# Patient Record
Sex: FEMALE | Race: WHITE | NOT HISPANIC OR LATINO | Employment: OTHER | URBAN - METROPOLITAN AREA
[De-identification: names, ages, dates, MRNs, and addresses within clinical notes are randomized per-mention and may not be internally consistent; named-entity substitution may affect disease eponyms.]

---

## 2018-10-08 ENCOUNTER — OFFICE VISIT CONVERTED (OUTPATIENT)
Dept: PLASTIC SURGERY | Facility: CLINIC | Age: 23
End: 2018-10-08
Attending: PLASTIC SURGERY

## 2018-10-25 ENCOUNTER — PROCEDURE VISIT CONVERTED (OUTPATIENT)
Dept: PLASTIC SURGERY | Facility: CLINIC | Age: 23
End: 2018-10-25
Attending: PLASTIC SURGERY

## 2018-11-02 ENCOUNTER — OFFICE VISIT CONVERTED (OUTPATIENT)
Dept: PLASTIC SURGERY | Facility: CLINIC | Age: 23
End: 2018-11-02
Attending: PLASTIC SURGERY

## 2021-05-16 VITALS
BODY MASS INDEX: 21.97 KG/M2 | WEIGHT: 124 LBS | HEART RATE: 91 BPM | HEIGHT: 63 IN | DIASTOLIC BLOOD PRESSURE: 87 MMHG | OXYGEN SATURATION: 96 % | SYSTOLIC BLOOD PRESSURE: 120 MMHG

## 2021-05-16 VITALS
SYSTOLIC BLOOD PRESSURE: 128 MMHG | HEIGHT: 63 IN | BODY MASS INDEX: 22.06 KG/M2 | OXYGEN SATURATION: 98 % | HEART RATE: 87 BPM | WEIGHT: 124.5 LBS | DIASTOLIC BLOOD PRESSURE: 90 MMHG

## 2021-05-16 VITALS
HEIGHT: 63 IN | BODY MASS INDEX: 21.97 KG/M2 | WEIGHT: 124 LBS | OXYGEN SATURATION: 99 % | DIASTOLIC BLOOD PRESSURE: 97 MMHG | SYSTOLIC BLOOD PRESSURE: 132 MMHG | HEART RATE: 90 BPM

## 2022-05-25 ENCOUNTER — LAB (OUTPATIENT)
Dept: LAB | Facility: HOSPITAL | Age: 27
End: 2022-05-25

## 2022-05-25 ENCOUNTER — OFFICE VISIT (OUTPATIENT)
Dept: FAMILY MEDICINE CLINIC | Facility: CLINIC | Age: 27
End: 2022-05-25

## 2022-05-25 VITALS
BODY MASS INDEX: 21.79 KG/M2 | DIASTOLIC BLOOD PRESSURE: 92 MMHG | HEIGHT: 63 IN | HEART RATE: 111 BPM | SYSTOLIC BLOOD PRESSURE: 124 MMHG | OXYGEN SATURATION: 100 % | WEIGHT: 123 LBS

## 2022-05-25 DIAGNOSIS — Z76.89 ENCOUNTER TO ESTABLISH CARE: ICD-10-CM

## 2022-05-25 DIAGNOSIS — F41.9 ANXIETY: Primary | ICD-10-CM

## 2022-05-25 DIAGNOSIS — Z13.220 SCREENING FOR LIPID DISORDERS: ICD-10-CM

## 2022-05-25 DIAGNOSIS — Z11.59 NEED FOR HEPATITIS C SCREENING TEST: ICD-10-CM

## 2022-05-25 DIAGNOSIS — Z13.29 SCREENING FOR THYROID DISORDER: ICD-10-CM

## 2022-05-25 DIAGNOSIS — F32.A DEPRESSION, UNSPECIFIED DEPRESSION TYPE: ICD-10-CM

## 2022-05-25 DIAGNOSIS — F90.9 ATTENTION DEFICIT HYPERACTIVITY DISORDER (ADHD), UNSPECIFIED ADHD TYPE: ICD-10-CM

## 2022-05-25 LAB
BASOPHILS # BLD AUTO: 0.04 10*3/MM3 (ref 0–0.2)
BASOPHILS NFR BLD AUTO: 0.5 % (ref 0–1.5)
DEPRECATED RDW RBC AUTO: 41.6 FL (ref 37–54)
EOSINOPHIL # BLD AUTO: 0.03 10*3/MM3 (ref 0–0.4)
EOSINOPHIL NFR BLD AUTO: 0.4 % (ref 0.3–6.2)
ERYTHROCYTE [DISTWIDTH] IN BLOOD BY AUTOMATED COUNT: 12.1 % (ref 12.3–15.4)
HCT VFR BLD AUTO: 41.9 % (ref 34–46.6)
HGB BLD-MCNC: 14 G/DL (ref 12–15.9)
IMM GRANULOCYTES # BLD AUTO: 0.02 10*3/MM3 (ref 0–0.05)
IMM GRANULOCYTES NFR BLD AUTO: 0.3 % (ref 0–0.5)
LYMPHOCYTES # BLD AUTO: 2.31 10*3/MM3 (ref 0.7–3.1)
LYMPHOCYTES NFR BLD AUTO: 30.8 % (ref 19.6–45.3)
MCH RBC QN AUTO: 31.1 PG (ref 26.6–33)
MCHC RBC AUTO-ENTMCNC: 33.4 G/DL (ref 31.5–35.7)
MCV RBC AUTO: 93.1 FL (ref 79–97)
MONOCYTES # BLD AUTO: 0.62 10*3/MM3 (ref 0.1–0.9)
MONOCYTES NFR BLD AUTO: 8.3 % (ref 5–12)
NEUTROPHILS NFR BLD AUTO: 4.47 10*3/MM3 (ref 1.7–7)
NEUTROPHILS NFR BLD AUTO: 59.7 % (ref 42.7–76)
NRBC BLD AUTO-RTO: 0 /100 WBC (ref 0–0.2)
PLATELET # BLD AUTO: 340 10*3/MM3 (ref 140–450)
PMV BLD AUTO: 10.8 FL (ref 6–12)
RBC # BLD AUTO: 4.5 10*6/MM3 (ref 3.77–5.28)
WBC NRBC COR # BLD: 7.49 10*3/MM3 (ref 3.4–10.8)

## 2022-05-25 PROCEDURE — 80061 LIPID PANEL: CPT

## 2022-05-25 PROCEDURE — 84443 ASSAY THYROID STIM HORMONE: CPT

## 2022-05-25 PROCEDURE — 36415 COLL VENOUS BLD VENIPUNCTURE: CPT

## 2022-05-25 PROCEDURE — 84439 ASSAY OF FREE THYROXINE: CPT

## 2022-05-25 PROCEDURE — 99203 OFFICE O/P NEW LOW 30 MIN: CPT | Performed by: NURSE PRACTITIONER

## 2022-05-25 PROCEDURE — 85025 COMPLETE CBC W/AUTO DIFF WBC: CPT

## 2022-05-25 PROCEDURE — 80074 ACUTE HEPATITIS PANEL: CPT

## 2022-05-25 PROCEDURE — 80053 COMPREHEN METABOLIC PANEL: CPT

## 2022-05-25 RX ORDER — CHOLECALCIFEROL (VITAMIN D3) 125 MCG
5 CAPSULE ORAL
COMMUNITY

## 2022-05-25 RX ORDER — LISDEXAMFETAMINE DIMESYLATE 30 MG/1
CAPSULE ORAL
COMMUNITY
Start: 2022-05-12

## 2022-05-25 RX ORDER — RIZATRIPTAN BENZOATE 10 MG/1
10 TABLET, ORALLY DISINTEGRATING ORAL
COMMUNITY
End: 2022-05-25

## 2022-05-25 RX ORDER — SUMATRIPTAN 100 MG/1
100 TABLET, FILM COATED ORAL
COMMUNITY
End: 2022-05-25

## 2022-05-25 RX ORDER — BUSPIRONE HYDROCHLORIDE 7.5 MG/1
7.5 TABLET ORAL 3 TIMES DAILY
COMMUNITY
Start: 2022-05-05

## 2022-05-25 RX ORDER — LEVONORGESTREL AND ETHINYL ESTRADIOL 0.15-0.03
KIT ORAL
COMMUNITY
End: 2022-05-25

## 2022-05-25 NOTE — PROGRESS NOTES
Chief Complaint  Establish Care and Annual Exam/labwork    Subjective            Nicole Meade presents to [facility]  Pt here today to establish care. Previous PCP was at [facility]. Pt states its been a few years since her last visit.     Pt sees Tiffanie Demar at [facility] for ADHD and Anxiety. They manage her Buspar, Sertaline, and Vyvanse.    Labs and pap due.    No problems or concerns at this time.         Past Medical History:   Diagnosis Date   • ADHD (attention deficit hyperactivity disorder) 2014   • Anxiety 2014   • Depression 2014   • Headache 2018       No Known Allergies     History reviewed. No pertinent surgical history.     Social History     Tobacco Use   • Smoking status: Never Smoker   • Smokeless tobacco: Not on file   Substance Use Topics   • Alcohol use: Never       Family History   Problem Relation Age of Onset   • Anxiety disorder Mother    • Depression Mother    • Heart disease Mother    • Hyperlipidemia Mother    • Kidney disease Mother    • Anxiety disorder Father    • Depression Father    • Heart disease Father    • Hyperlipidemia Father    • Kidney disease Father    • Anxiety disorder Maternal Grandfather    • Alcohol abuse Maternal Grandmother    • Anxiety disorder Brother    • Depression Brother         Current Outpatient Medications on File Prior to Visit   Medication Sig   • busPIRone (BUSPAR) 7.5 MG tablet Take 7.5 mg by mouth 3 (Three) Times a Day.   • melatonin 5 MG tablet tablet Take 5 mg by mouth.   • sertraline (ZOLOFT) 50 MG tablet TAKE 1 AND 1/2 TABLETS BY MOUTH EVERY MORNING FOR ANXIETY OR DEPRESSION   • Vyvanse 30 MG capsule TAKE 1 CAPSULE BY MOUTH EVERY MORNING FOR ADHD   • [DISCONTINUED] levonorgestrel-ethinyl estradiol (NORDETTE) 0.15-30 MG-MCG per tablet Altavera (28) 0.15-0.03 mg oral tablet take 1 tablet by oral route once daily   Active   • [DISCONTINUED] propranolol XL (INNOPRAN XL) 80 MG 24 hr capsule Take 80 mg by mouth Daily.  "  • [DISCONTINUED] rizatriptan MLT (MAXALT-MLT) 10 MG disintegrating tablet Take 10 mg by mouth.   • [DISCONTINUED] SUMAtriptan (IMITREX) 100 MG tablet Take 100 mg by mouth.     No current facility-administered medications on file prior to visit.       Health Maintenance Due   Topic Date Due   • ANNUAL PHYSICAL  Never done   • TDAP/TD VACCINES (2 - Td or Tdap) 08/03/2017   • COVID-19 Vaccine (3 - Booster for Pfizer series) 11/05/2021   • HEPATITIS C SCREENING  Never done   • PAP SMEAR  Never done       Objective     /92   Pulse 111   Ht 160 cm (63\")   Wt 55.8 kg (123 lb)   SpO2 100%   BMI 21.79 kg/m²       Physical Exam  Constitutional:       General: She is not in acute distress.     Appearance: Normal appearance. She is not ill-appearing.   HENT:      Head: Normocephalic and atraumatic.      Mouth/Throat:      Pharynx: No oropharyngeal exudate or posterior oropharyngeal erythema.   Cardiovascular:      Rate and Rhythm: Normal rate and regular rhythm.      Heart sounds: Normal heart sounds. No murmur heard.  Pulmonary:      Effort: Pulmonary effort is normal. No respiratory distress.      Breath sounds: Normal breath sounds.   Chest:      Chest wall: No tenderness.   Abdominal:      General: There is no distension.      Palpations: There is no mass.      Tenderness: There is no abdominal tenderness. There is no guarding.   Musculoskeletal:         General: No swelling or tenderness. Normal range of motion.      Cervical back: Normal range of motion and neck supple.   Skin:     General: Skin is warm and dry.      Findings: No rash.   Neurological:      General: No focal deficit present.      Mental Status: She is alert and oriented to person, place, and time. Mental status is at baseline.      Gait: Gait normal.   Psychiatric:         Mood and Affect: Mood normal.         Behavior: Behavior normal.         Thought Content: Thought content normal.         Judgment: Judgment normal.           Result Review " :                           Assessment and Plan        Diagnoses and all orders for this visit:    1. Anxiety (Primary)  Comments:  managed by [facility], pt to continue to f/u montly for management    2. Encounter to establish care  -     CBC w AUTO Differential; Future  -     Comprehensive metabolic panel; Future  -     Lipid panel; Future  -     TSH; Future  -     T4, free; Future  -     Hepatitis panel, acute; Future    3. Screening for thyroid disorder  -     TSH; Future  -     T4, free; Future    4. Screening for lipid disorders  -     CBC w AUTO Differential; Future  -     Comprehensive metabolic panel; Future  -     Lipid panel; Future    5. Attention deficit hyperactivity disorder (ADHD), unspecified ADHD type  Comments:  managed by [facility], pt to continue to f/u montly for management    6. Depression, unspecified depression type  Comments:  managed by [facility], pt to continue to f/u montly for management    7. Need for hepatitis C screening test  -     Hepatitis panel, acute; Future              Follow Up     Return in about 1 year (around 5/25/2023) for Annual physical.    Patient was given instructions and counseling regarding her condition or for health maintenance advice. Please see specific information pulled into the AVS if appropriate.     Nicole Meade  reports that she has never smoked. She does not have any smokeless tobacco history on file..

## 2022-05-26 ENCOUNTER — TELEPHONE (OUTPATIENT)
Dept: FAMILY MEDICINE CLINIC | Facility: CLINIC | Age: 27
End: 2022-05-26

## 2022-05-26 LAB
ALBUMIN SERPL-MCNC: 4.8 G/DL (ref 3.5–5.2)
ALBUMIN/GLOB SERPL: 2 G/DL
ALP SERPL-CCNC: 84 U/L (ref 39–117)
ALT SERPL W P-5'-P-CCNC: 15 U/L (ref 1–33)
ANION GAP SERPL CALCULATED.3IONS-SCNC: 14.4 MMOL/L (ref 5–15)
AST SERPL-CCNC: 19 U/L (ref 1–32)
BILIRUB SERPL-MCNC: 0.5 MG/DL (ref 0–1.2)
BUN SERPL-MCNC: 12 MG/DL (ref 6–20)
BUN/CREAT SERPL: 15 (ref 7–25)
CALCIUM SPEC-SCNC: 9.2 MG/DL (ref 8.6–10.5)
CHLORIDE SERPL-SCNC: 103 MMOL/L (ref 98–107)
CHOLEST SERPL-MCNC: 133 MG/DL (ref 0–200)
CO2 SERPL-SCNC: 21.6 MMOL/L (ref 22–29)
CREAT SERPL-MCNC: 0.8 MG/DL (ref 0.57–1)
EGFRCR SERPLBLD CKD-EPI 2021: 103.7 ML/MIN/1.73
GLOBULIN UR ELPH-MCNC: 2.4 GM/DL
GLUCOSE SERPL-MCNC: 84 MG/DL (ref 65–99)
HAV IGM SERPL QL IA: NORMAL
HBV CORE IGM SERPL QL IA: NORMAL
HBV SURFACE AG SERPL QL IA: NORMAL
HCV AB SER DONR QL: NORMAL
HDLC SERPL-MCNC: 58 MG/DL (ref 40–60)
LDLC SERPL CALC-MCNC: 62 MG/DL (ref 0–100)
LDLC/HDLC SERPL: 1.08 {RATIO}
POTASSIUM SERPL-SCNC: 3.9 MMOL/L (ref 3.5–5.2)
PROT SERPL-MCNC: 7.2 G/DL (ref 6–8.5)
SODIUM SERPL-SCNC: 139 MMOL/L (ref 136–145)
T4 FREE SERPL-MCNC: 1.29 NG/DL (ref 0.93–1.7)
TRIGL SERPL-MCNC: 62 MG/DL (ref 0–150)
TSH SERPL DL<=0.05 MIU/L-ACNC: 1.14 UIU/ML (ref 0.27–4.2)
VLDLC SERPL-MCNC: 13 MG/DL (ref 5–40)

## 2022-05-27 ENCOUNTER — PATIENT ROUNDING (BHMG ONLY) (OUTPATIENT)
Dept: FAMILY MEDICINE CLINIC | Facility: CLINIC | Age: 27
End: 2022-05-27

## 2022-05-27 ENCOUNTER — PATIENT MESSAGE (OUTPATIENT)
Dept: FAMILY MEDICINE CLINIC | Facility: CLINIC | Age: 27
End: 2022-05-27

## 2022-05-27 NOTE — PROGRESS NOTES
A [employer] message has been sent to the patient for PATIENT ROUNDING with [facility].

## 2022-06-08 NOTE — TELEPHONE ENCOUNTER
From: Emily GRANT  To: Nicole Dasha Meade  Sent: 5/27/2022 3:23 PM EDT  Subject: Welcome to [facility]!      Raul Ms. Meade,     My name is Emily Hoang and I am the practice manager for [facility]'s office.     I wanted to officially welcome you to our practice and ask about your recent visit, as we are always looking for ways to improve. If you could take a few minutes to answer a few questions, I would appreciate your feedback.     Tell me about your visit with us.    What things went well?   Do you have any recommendations on how we can improve?   Overall were you satisfied with your first visit to our practice?     Thank you for taking the time to respond, and if there is anything you need, please do not hesitate to call us at [phone].    Sincerely,   Emily Hoang LPN  Practice Manager II

## 2022-06-15 ENCOUNTER — OFFICE VISIT (OUTPATIENT)
Dept: FAMILY MEDICINE CLINIC | Facility: CLINIC | Age: 27
End: 2022-06-15

## 2022-06-15 VITALS
HEART RATE: 138 BPM | BODY MASS INDEX: 21.72 KG/M2 | DIASTOLIC BLOOD PRESSURE: 92 MMHG | HEIGHT: 63 IN | OXYGEN SATURATION: 98 % | WEIGHT: 122.6 LBS | SYSTOLIC BLOOD PRESSURE: 135 MMHG

## 2022-06-15 DIAGNOSIS — Z01.419 WELL WOMAN EXAM: ICD-10-CM

## 2022-06-15 DIAGNOSIS — Z01.419 ENCOUNTER FOR GYNECOLOGICAL EXAMINATION: Primary | ICD-10-CM

## 2022-06-15 DIAGNOSIS — Z11.3 SCREEN FOR STD (SEXUALLY TRANSMITTED DISEASE): ICD-10-CM

## 2022-06-15 DIAGNOSIS — Z12.4 SCREENING FOR CERVICAL CANCER: ICD-10-CM

## 2022-06-15 LAB
BILIRUB BLD-MCNC: NEGATIVE MG/DL
CLARITY, POC: CLEAR
COLOR UR: YELLOW
EXPIRATION DATE: NORMAL
GLUCOSE UR STRIP-MCNC: NEGATIVE MG/DL
KETONES UR QL: NEGATIVE
LEUKOCYTE EST, POC: NEGATIVE
Lab: NORMAL
NITRITE UR-MCNC: NEGATIVE MG/ML
PH UR: 7 [PH] (ref 5–8)
PROT UR STRIP-MCNC: NEGATIVE MG/DL
RBC # UR STRIP: NEGATIVE /UL
SP GR UR: 1.01 (ref 1–1.03)
UROBILINOGEN UR QL: NORMAL

## 2022-06-15 PROCEDURE — 87510 GARDNER VAG DNA DIR PROBE: CPT | Performed by: NURSE PRACTITIONER

## 2022-06-15 PROCEDURE — 3008F BODY MASS INDEX DOCD: CPT | Performed by: NURSE PRACTITIONER

## 2022-06-15 PROCEDURE — 87205 SMEAR GRAM STAIN: CPT | Performed by: NURSE PRACTITIONER

## 2022-06-15 PROCEDURE — 87529 HSV DNA AMP PROBE: CPT | Performed by: NURSE PRACTITIONER

## 2022-06-15 PROCEDURE — 87660 TRICHOMONAS VAGIN DIR PROBE: CPT | Performed by: NURSE PRACTITIONER

## 2022-06-15 PROCEDURE — 87591 N.GONORRHOEAE DNA AMP PROB: CPT | Performed by: NURSE PRACTITIONER

## 2022-06-15 PROCEDURE — 87491 CHLMYD TRACH DNA AMP PROBE: CPT | Performed by: NURSE PRACTITIONER

## 2022-06-15 PROCEDURE — G0123 SCREEN CERV/VAG THIN LAYER: HCPCS | Performed by: NURSE PRACTITIONER

## 2022-06-15 PROCEDURE — 87070 CULTURE OTHR SPECIMN AEROBIC: CPT | Performed by: NURSE PRACTITIONER

## 2022-06-15 PROCEDURE — 2014F MENTAL STATUS ASSESS: CPT | Performed by: NURSE PRACTITIONER

## 2022-06-15 PROCEDURE — 87480 CANDIDA DNA DIR PROBE: CPT | Performed by: NURSE PRACTITIONER

## 2022-06-15 PROCEDURE — 81003 URINALYSIS AUTO W/O SCOPE: CPT | Performed by: NURSE PRACTITIONER

## 2022-06-15 PROCEDURE — 99395 PREV VISIT EST AGE 18-39: CPT | Performed by: NURSE PRACTITIONER

## 2022-06-15 NOTE — PROGRESS NOTES
"Chief Complaint  Pap smear  Subjective            Nicole Meade presents to [facility]  Gynecologic Exam (Patient is here for pap smear. Patient's last pap smear was in 2020. Patient states no history of abnormal pap smear. LMP was 6/2/22.)        Past Medical History:   Diagnosis Date   • ADHD (attention deficit hyperactivity disorder) 2014   • Anxiety 2014   • Depression 2014   • Headache 2018       No Known Allergies     History reviewed. No pertinent surgical history.     Social History     Tobacco Use   • Smoking status: Never Smoker   • Smokeless tobacco: Never Used   Substance Use Topics   • Alcohol use: Never       Family History   Problem Relation Age of Onset   • Anxiety disorder Mother    • Depression Mother    • Heart disease Mother    • Hyperlipidemia Mother    • Kidney disease Mother    • Anxiety disorder Father    • Depression Father    • Heart disease Father    • Hyperlipidemia Father    • Kidney disease Father    • Anxiety disorder Maternal Grandfather    • Alcohol abuse Maternal Grandmother    • Anxiety disorder Brother    • Depression Brother         Current Outpatient Medications on File Prior to Visit   Medication Sig   • busPIRone (BUSPAR) 7.5 MG tablet Take 7.5 mg by mouth 3 (Three) Times a Day.   • melatonin 5 MG tablet tablet Take 5 mg by mouth.   • sertraline (ZOLOFT) 50 MG tablet TAKE 1 AND 1/2 TABLETS BY MOUTH EVERY MORNING FOR ANXIETY OR DEPRESSION   • Vyvanse 30 MG capsule TAKE 1 CAPSULE BY MOUTH EVERY MORNING FOR ADHD     No current facility-administered medications on file prior to visit.       Health Maintenance Due   Topic Date Due   • ANNUAL PHYSICAL  Never done   • TDAP/TD VACCINES (2 - Td or Tdap) 08/03/2017   • COVID-19 Vaccine (3 - Booster for Pfizer series) 11/05/2021       Objective     /92 (BP Location: Left arm, Patient Position: Sitting, Cuff Size: Adult)   Pulse (!) 138   Ht 160 cm (63\")   Wt 55.6 kg (122 lb 9.6 oz)   SpO2 " 98%   BMI 21.72 kg/m²       Physical Exam  Constitutional:       General: She is awake. She is not in acute distress.     Appearance: Normal appearance. She is normal weight. She is not ill-appearing.   HENT:      Head: Normocephalic.   Cardiovascular:      Rate and Rhythm: Normal rate and regular rhythm.      Heart sounds: Normal heart sounds, S1 normal and S2 normal.   Pulmonary:      Effort: Pulmonary effort is normal.      Breath sounds: Normal breath sounds.   Chest:   Breasts:      Right: Normal. No swelling, mass, nipple discharge, skin change or tenderness.      Left: Normal. No swelling, mass, nipple discharge, skin change or tenderness.       Abdominal:      General: Abdomen is flat. Bowel sounds are normal.      Palpations: Abdomen is soft.      Tenderness: There is no abdominal tenderness.   Genitourinary:     General: Normal vulva.      Pubic Area: No rash.       Labia:         Right: No rash, tenderness or lesion.         Left: No rash, tenderness or lesion.       Urethra: No urethral pain, urethral swelling or urethral lesion.      Vagina: Normal. No vaginal discharge.      Cervix: Normal.      Uterus: Normal.       Adnexa: Right adnexa normal and left adnexa normal.      Rectum: Normal.      Comments: Pap obtained via cytobrush without complication  Musculoskeletal:         General: Normal range of motion.      Cervical back: Normal range of motion and neck supple.      Right lower leg: No edema.      Left lower leg: No edema.   Skin:     General: Skin is warm.      Findings: No lesion or rash.   Neurological:      General: No focal deficit present.      Mental Status: She is alert and oriented to person, place, and time. Mental status is at baseline.      Cranial Nerves: Cranial nerves are intact.      Motor: Motor function is intact.      Coordination: Coordination is intact.      Gait: Gait is intact.   Psychiatric:         Attention and Perception: Attention and perception normal.         Mood  and Affect: Mood and affect normal.         Speech: Speech normal.         Behavior: Behavior normal. Behavior is cooperative.         Thought Content: Thought content normal.         Cognition and Memory: Cognition and memory normal.         Judgment: Judgment normal.           Result Review :                           Assessment and Plan        Diagnoses and all orders for this visit:    1. Encounter for gynecological examination (Primary)  -     POC Urinalysis Dipstick, Automated    2. Well woman exam  -     IGP,rfx Aptima HPV All Pth; Future    3. Screening for cervical cancer  -     IGP,rfx Aptima HPV All Pth; Future      Preventative counseling:  Advised monthly self breast exams  Healthy diet  Daily exercise        Follow Up     Return in about 1 year (around 6/15/2023).    Patient was given instructions and counseling regarding her condition or for health maintenance advice. Please see specific information pulled into the AVS if appropriate.            CECY Levin

## 2022-06-16 LAB
C TRACH RRNA CVX QL NAA+PROBE: NOT DETECTED
CANDIDA SPECIES: NEGATIVE
GARDNERELLA VAGINALIS: NEGATIVE
HSV1 DNA SPEC QL NAA+PROBE: NOT DETECTED
HSV2 DNA SPEC QL NAA+PROBE: NOT DETECTED
N GONORRHOEA RRNA SPEC QL NAA+PROBE: NOT DETECTED
T VAGINALIS DNA VAG QL PROBE+SIG AMP: NEGATIVE

## 2022-06-18 LAB
BACTERIA SPEC AEROBE CULT: NORMAL
GRAM STN SPEC: NORMAL

## 2022-06-21 ENCOUNTER — TELEPHONE (OUTPATIENT)
Dept: FAMILY MEDICINE CLINIC | Facility: CLINIC | Age: 27
End: 2022-06-21

## 2022-06-21 LAB
CONV .: NORMAL
CYTOLOGIST CVX/VAG CYTO: NORMAL
CYTOLOGY CVX/VAG DOC CYTO: NORMAL
CYTOLOGY CVX/VAG DOC THIN PREP: NORMAL
DX ICD CODE: NORMAL
HIV 1 & 2 AB SER-IMP: NORMAL
Lab: NORMAL
OTHER STN SPEC: NORMAL
STAT OF ADQ CVX/VAG CYTO-IMP: NORMAL

## 2023-03-07 ENCOUNTER — HOSPITAL ENCOUNTER (OUTPATIENT)
Dept: GENERAL RADIOLOGY | Facility: HOSPITAL | Age: 28
Discharge: HOME | End: 2023-03-07
Admitting: NURSE PRACTITIONER
Payer: COMMERCIAL

## 2023-03-07 ENCOUNTER — OFFICE VISIT (OUTPATIENT)
Dept: FAMILY MEDICINE CLINIC | Facility: CLINIC | Age: 28
End: 2023-03-07
Payer: COMMERCIAL

## 2023-03-07 VITALS
HEART RATE: 105 BPM | BODY MASS INDEX: 21.97 KG/M2 | WEIGHT: 124 LBS | OXYGEN SATURATION: 99 % | SYSTOLIC BLOOD PRESSURE: 125 MMHG | HEIGHT: 63 IN | DIASTOLIC BLOOD PRESSURE: 74 MMHG

## 2023-03-07 DIAGNOSIS — M25.531 CHRONIC PAIN OF RIGHT WRIST: ICD-10-CM

## 2023-03-07 DIAGNOSIS — Z00.00 ANNUAL PHYSICAL EXAM: Primary | ICD-10-CM

## 2023-03-07 DIAGNOSIS — Z13.29 SCREENING FOR THYROID DISORDER: ICD-10-CM

## 2023-03-07 DIAGNOSIS — G89.29 CHRONIC PAIN OF RIGHT WRIST: ICD-10-CM

## 2023-03-07 DIAGNOSIS — Z13.220 SCREENING FOR LIPID DISORDERS: ICD-10-CM

## 2023-03-07 PROCEDURE — 3008F BODY MASS INDEX DOCD: CPT | Performed by: NURSE PRACTITIONER

## 2023-03-07 PROCEDURE — 99395 PREV VISIT EST AGE 18-39: CPT | Performed by: NURSE PRACTITIONER

## 2023-03-07 PROCEDURE — 2014F MENTAL STATUS ASSESS: CPT | Performed by: NURSE PRACTITIONER

## 2023-03-07 PROCEDURE — 73110 X-RAY EXAM OF WRIST: CPT

## 2023-03-07 PROCEDURE — 90715 TDAP VACCINE 7 YRS/> IM: CPT | Performed by: NURSE PRACTITIONER

## 2023-03-07 PROCEDURE — 90471 IMMUNIZATION ADMIN: CPT | Performed by: NURSE PRACTITIONER

## 2023-03-07 PROCEDURE — 1160F RVW MEDS BY RX/DR IN RCRD: CPT | Performed by: NURSE PRACTITIONER

## 2023-03-07 PROCEDURE — 1159F MED LIST DOCD IN RCRD: CPT | Performed by: NURSE PRACTITIONER

## 2023-03-07 NOTE — PROGRESS NOTES
Chief Complaint  Anxiety, Depression, and Wrist Pain    Subjective            Nicole Meade presents to [facility]  History of Present Illness  Pt is an annual CPE for anxiety and depression. Pt has c/o ongoing (R) wrist pain. Pt states she had an injury to her (R) wrist approximately 2 yrs ago. Pt was seen at [facility]. Pt states the pain is now off and on. Pt states if she moves her hand a certain way, she has pain that shoots up into her arm. Pt states she also has problems lifting items at times.     Pt is due labs.    Pt will get Tdap today. Pt will come back to office for Covid.    Pt is followed by Tiffanie at [facility] in [place] for psych mgmt.          Past Medical History:   Diagnosis Date   • ADHD (attention deficit hyperactivity disorder) 2014   • Anxiety 2014   • Depression 2014   • Headache 2018       No Known Allergies     History reviewed. No pertinent surgical history.     Social History     Tobacco Use   • Smoking status: Never   • Smokeless tobacco: Never   Substance Use Topics   • Alcohol use: Never       Family History   Problem Relation Age of Onset   • Anxiety disorder Mother    • Depression Mother    • Heart disease Mother    • Hyperlipidemia Mother    • Kidney disease Mother    • Anxiety disorder Father    • Depression Father    • Heart disease Father    • Hyperlipidemia Father    • Kidney disease Father    • Anxiety disorder Maternal Grandfather    • Alcohol abuse Maternal Grandmother    • Anxiety disorder Brother    • Depression Brother         Current Outpatient Medications on File Prior to Visit   Medication Sig   • busPIRone (BUSPAR) 7.5 MG tablet Take 1 tablet by mouth 3 (Three) Times a Day.   • melatonin 5 MG tablet tablet Take 1 tablet by mouth.   • sertraline (ZOLOFT) 50 MG tablet TAKE 1 AND 1/2 TABLETS BY MOUTH EVERY MORNING FOR ANXIETY OR DEPRESSION   • Vyvanse 30 MG capsule TAKE 1 CAPSULE BY MOUTH EVERY MORNING FOR ADHD     No current  "facility-administered medications on file prior to visit.       Health Maintenance Due   Topic Date Due   • TDAP/TD VACCINES (2 - Td or Tdap) 08/03/2017   • COVID-19 Vaccine (3 - Booster for Pfizer series) 07/31/2021   • ANNUAL PHYSICAL  Never done       Objective     /74   Pulse 105   Ht 160 cm (63\")   Wt 56.2 kg (124 lb)   SpO2 99%   BMI 21.97 kg/m²       Physical Exam  Constitutional:       General: She is not in acute distress.     Appearance: Normal appearance. She is not ill-appearing.   HENT:      Head: Normocephalic and atraumatic.      Right Ear: Tympanic membrane, ear canal and external ear normal.      Left Ear: Tympanic membrane, ear canal and external ear normal.      Nose: Nose normal.   Cardiovascular:      Rate and Rhythm: Normal rate and regular rhythm.      Pulses: Normal pulses.      Heart sounds: Normal heart sounds. No murmur heard.  Pulmonary:      Effort: Pulmonary effort is normal. No respiratory distress.      Breath sounds: Normal breath sounds.   Chest:      Chest wall: No tenderness.   Abdominal:      General: Abdomen is flat. Bowel sounds are normal. There is no distension.      Palpations: Abdomen is soft. There is no mass.      Tenderness: There is no abdominal tenderness. There is no guarding.   Musculoskeletal:         General: No swelling or tenderness. Normal range of motion.      Cervical back: Normal range of motion and neck supple.   Skin:     General: Skin is warm and dry.      Findings: No rash.   Neurological:      General: No focal deficit present.      Mental Status: She is alert and oriented to person, place, and time. Mental status is at baseline.      Gait: Gait normal.   Psychiatric:         Mood and Affect: Mood normal.         Behavior: Behavior normal.         Thought Content: Thought content normal.         Judgment: Judgment normal.           Result Review :                           Assessment and Plan        Diagnoses and all orders for this " visit:    1. Annual physical exam (Primary)  -     CBC w AUTO Differential; Future  -     Comprehensive metabolic panel; Future  -     Lipid panel; Future  -     TSH; Future  -     T4, free; Future  -     Tdap Vaccine Greater Than or Equal To 6yo IM    2. Screening for lipid disorders  -     Comprehensive metabolic panel; Future  -     Lipid panel; Future    3. Screening for thyroid disorder  -     CBC w AUTO Differential; Future  -     TSH; Future  -     T4, free; Future    4. Chronic pain of right wrist  -     XR Wrist 3+ View Right; Future  -     Diclofenac Sodium (VOLTAREN) 1 % gel gel; Apply 2 g topically to the appropriate area as directed 4 (Four) Times a Day As Needed (right wrist pain).  Dispense: 150 g; Refill: 0      Preventative Counseling:  Healthy diet  Daily exercise  Get adequate sleep        Follow Up     Return in about 1 year (around 3/7/2024).    Patient was given instructions and counseling regarding her condition or for health maintenance advice. Please see specific information pulled into the AVS if appropriate.     Nicole Lou Halina  reports that she has never smoked. She has never used smokeless tobacco..

## 2023-06-19 ENCOUNTER — OFFICE VISIT (OUTPATIENT)
Dept: FAMILY MEDICINE CLINIC | Facility: CLINIC | Age: 28
End: 2023-06-19
Payer: COMMERCIAL

## 2023-06-19 VITALS
HEART RATE: 93 BPM | WEIGHT: 120 LBS | DIASTOLIC BLOOD PRESSURE: 75 MMHG | OXYGEN SATURATION: 100 % | BODY MASS INDEX: 21.26 KG/M2 | HEIGHT: 63 IN | SYSTOLIC BLOOD PRESSURE: 122 MMHG

## 2023-06-19 DIAGNOSIS — Z01.419 WELL WOMAN EXAM WITH ROUTINE GYNECOLOGICAL EXAM: Primary | ICD-10-CM

## 2023-06-19 DIAGNOSIS — Z12.4 SCREENING FOR CERVICAL CANCER: ICD-10-CM

## 2023-06-19 LAB
BILIRUB BLD-MCNC: NEGATIVE MG/DL
CLARITY, POC: CLEAR
COLOR UR: YELLOW
EXPIRATION DATE: NORMAL
GLUCOSE UR STRIP-MCNC: NEGATIVE MG/DL
KETONES UR QL: NEGATIVE
LEUKOCYTE EST, POC: NEGATIVE
Lab: NORMAL
NITRITE UR-MCNC: NEGATIVE MG/ML
PH UR: 7 [PH] (ref 5–8)
PROT UR STRIP-MCNC: NEGATIVE MG/DL
RBC # UR STRIP: NEGATIVE /UL
SP GR UR: 1.02 (ref 1–1.03)
UROBILINOGEN UR QL: NORMAL

## 2023-06-19 NOTE — PROGRESS NOTES
Chief Complaint  Well woman exam    Subjective            Nicole Meade presents to [facility]  History of Present Illness  Pt is here for well woman exam/pap. No issues or concerns at this time.     LMP: 6/12/23, WNL.    Last PAP:   6/15/22    Pt is due covid vaccine. Pt declines and understands the risks of not having.     PT has labs pending.    PT is followed by [facility] for PSY and med margie for anxiety/depression.        Past Medical History:   Diagnosis Date    ADHD (attention deficit hyperactivity disorder) 2014    Anxiety 2014    Depression 2014    Headache 2018       No Known Allergies     History reviewed. No pertinent surgical history.     Social History     Tobacco Use    Smoking status: Never    Smokeless tobacco: Never   Substance Use Topics    Alcohol use: Never       Family History   Problem Relation Age of Onset    Anxiety disorder Mother     Depression Mother     Heart disease Mother     Hyperlipidemia Mother     Kidney disease Mother     Anxiety disorder Father     Depression Father     Heart disease Father     Hyperlipidemia Father     Kidney disease Father     Anxiety disorder Maternal Grandfather     Alcohol abuse Maternal Grandmother     Anxiety disorder Brother     Depression Brother         Current Outpatient Medications on File Prior to Visit   Medication Sig    busPIRone (BUSPAR) 7.5 MG tablet Take 1 tablet by mouth 3 (Three) Times a Day.    Diclofenac Sodium (VOLTAREN) 1 % gel gel Apply 2 g topically to the appropriate area as directed 4 (Four) Times a Day As Needed (right wrist pain).    melatonin 5 MG tablet tablet Take 1 tablet by mouth.    sertraline (ZOLOFT) 50 MG tablet TAKE 1 AND 1/2 TABLETS BY MOUTH EVERY MORNING FOR ANXIETY OR DEPRESSION    Vyvanse 30 MG capsule TAKE 1 CAPSULE BY MOUTH EVERY MORNING FOR ADHD     No current facility-administered medications on file prior to visit.       Health Maintenance Due   Topic Date Due    COVID-19  "Vaccine (3 - Pfizer series) 07/31/2021       Objective     /75   Pulse 93   Ht 160 cm (63\")   Wt 54.4 kg (120 lb)   SpO2 100%   BMI 21.26 kg/m²       Physical Exam  Constitutional:       General: She is awake. She is not in acute distress.     Appearance: Normal appearance. She is normal weight. She is not ill-appearing.   HENT:      Head: Normocephalic.      Right Ear: Tympanic membrane, ear canal and external ear normal.      Left Ear: Tympanic membrane, ear canal and external ear normal.      Nose: Nose normal.      Mouth/Throat:      Mouth: Mucous membranes are moist.      Pharynx: Oropharynx is clear.   Cardiovascular:      Rate and Rhythm: Normal rate and regular rhythm.      Heart sounds: Normal heart sounds, S1 normal and S2 normal.   Pulmonary:      Effort: Pulmonary effort is normal.      Breath sounds: Normal breath sounds.   Chest:   Breasts:     Right: Normal. No swelling, mass, nipple discharge, skin change or tenderness.      Left: Normal. No swelling, mass, nipple discharge, skin change or tenderness.   Abdominal:      General: Abdomen is flat. Bowel sounds are normal.      Palpations: Abdomen is soft.      Tenderness: There is no abdominal tenderness.   Genitourinary:     General: Normal vulva.      Pubic Area: No rash.       Labia:         Right: No rash, tenderness or lesion.         Left: No rash, tenderness or lesion.       Urethra: No urethral pain, urethral swelling or urethral lesion.      Vagina: Normal. No vaginal discharge.      Cervix: Normal.      Uterus: Normal.       Adnexa: Right adnexa normal and left adnexa normal.      Rectum: Normal.      Comments: Pap obtained via cyto brush without complication  Musculoskeletal:         General: Normal range of motion.      Cervical back: Normal range of motion and neck supple.      Right lower leg: No edema.      Left lower leg: No edema.   Skin:     General: Skin is warm.      Findings: No lesion or rash.   Neurological:      " General: No focal deficit present.      Mental Status: She is alert and oriented to person, place, and time. Mental status is at baseline.      Motor: Motor function is intact.      Coordination: Coordination is intact.      Gait: Gait is intact.   Psychiatric:         Attention and Perception: Attention and perception normal.         Mood and Affect: Mood and affect normal.         Speech: Speech normal.         Behavior: Behavior normal. Behavior is cooperative.         Thought Content: Thought content normal. Thought content does not include suicidal ideation.         Cognition and Memory: Cognition and memory normal.         Judgment: Judgment normal.         Result Review :                           Assessment and Plan        Diagnoses and all orders for this visit:    1. Well woman exam with routine gynecological exam (Primary)  -     POCT urinalysis dipstick, automated  -     IGP,rfx Aptima HPV All Pth; Future  -     IGP,rfx Aptima HPV All Pth    2. Screening for cervical cancer  -     IGP,rfx Aptima HPV All Pth; Future  -     IGP,rfx Aptima HPV All Pth    Preventative counseling:  Healthy diet  Daily exercise  Get adequate sleep          Follow Up     Return in about 1 year (around 6/19/2024) for Annual physical.    Patient was given instructions and counseling regarding her condition or for health maintenance advice. Please see specific information pulled into the AVS if appropriate.     Nicole Hurdnes  reports that she has never smoked. She has never used smokeless tobacco.. CECY Gordon

## 2023-06-21 LAB
CONV .: NORMAL
CYTOLOGIST CVX/VAG CYTO: NORMAL
CYTOLOGY CVX/VAG DOC CYTO: NORMAL
CYTOLOGY CVX/VAG DOC THIN PREP: NORMAL
DX ICD CODE: NORMAL
HIV 1 & 2 AB SER-IMP: NORMAL
OTHER STN SPEC: NORMAL
STAT OF ADQ CVX/VAG CYTO-IMP: NORMAL

## 2024-05-11 PROCEDURE — 87077 CULTURE AEROBIC IDENTIFY: CPT | Performed by: FAMILY MEDICINE

## 2024-05-11 PROCEDURE — 87086 URINE CULTURE/COLONY COUNT: CPT | Performed by: FAMILY MEDICINE

## 2024-05-11 PROCEDURE — 87186 SC STD MICRODIL/AGAR DIL: CPT | Performed by: FAMILY MEDICINE

## 2024-05-23 ENCOUNTER — TELEPHONE (OUTPATIENT)
Dept: FAMILY MEDICINE CLINIC | Facility: CLINIC | Age: 29
End: 2024-05-23
Payer: COMMERCIAL

## 2024-05-24 NOTE — TELEPHONE ENCOUNTER
HUB TO RELAY AND SCHEDULE      2nd attempt: Called patient to reschedule an appt on 6/24/24. Lvm

## 2024-05-28 NOTE — TELEPHONE ENCOUNTER
HUB TO RELAY AND SCHEDULE      3rd attempt: Called patient to reschedule an appt on 6/24/24. Lvm

## 2024-07-11 ENCOUNTER — OFFICE VISIT (OUTPATIENT)
Dept: FAMILY MEDICINE CLINIC | Facility: CLINIC | Age: 29
End: 2024-07-11
Payer: COMMERCIAL

## 2024-07-11 VITALS
SYSTOLIC BLOOD PRESSURE: 123 MMHG | DIASTOLIC BLOOD PRESSURE: 82 MMHG | HEIGHT: 63 IN | BODY MASS INDEX: 21.62 KG/M2 | OXYGEN SATURATION: 100 % | WEIGHT: 122 LBS | HEART RATE: 102 BPM

## 2024-07-11 DIAGNOSIS — N30.00 ACUTE CYSTITIS WITHOUT HEMATURIA: ICD-10-CM

## 2024-07-11 DIAGNOSIS — F90.9 ATTENTION DEFICIT HYPERACTIVITY DISORDER (ADHD), UNSPECIFIED ADHD TYPE: ICD-10-CM

## 2024-07-11 DIAGNOSIS — R35.0 URINARY FREQUENCY: ICD-10-CM

## 2024-07-11 DIAGNOSIS — F41.9 ANXIETY: ICD-10-CM

## 2024-07-11 DIAGNOSIS — Z00.00 ANNUAL PHYSICAL EXAM: Primary | ICD-10-CM

## 2024-07-11 DIAGNOSIS — Z13.220 SCREENING FOR CHOLESTEROL LEVEL: ICD-10-CM

## 2024-07-11 DIAGNOSIS — F32.A DEPRESSION, UNSPECIFIED DEPRESSION TYPE: ICD-10-CM

## 2024-07-11 DIAGNOSIS — R82.90 MALODOROUS URINE: ICD-10-CM

## 2024-07-11 DIAGNOSIS — R30.0 DYSURIA: ICD-10-CM

## 2024-07-11 DIAGNOSIS — Z13.29 SCREENING FOR THYROID DISORDER: ICD-10-CM

## 2024-07-11 LAB
BILIRUB BLD-MCNC: NEGATIVE MG/DL
CLARITY, POC: CLEAR
COLOR UR: YELLOW
EXPIRATION DATE: ABNORMAL
GLUCOSE UR STRIP-MCNC: NEGATIVE MG/DL
KETONES UR QL: NEGATIVE
LEUKOCYTE EST, POC: ABNORMAL
Lab: ABNORMAL
NITRITE UR-MCNC: NEGATIVE MG/ML
PH UR: 7 [PH] (ref 5–8)
PROT UR STRIP-MCNC: NEGATIVE MG/DL
RBC # UR STRIP: ABNORMAL /UL
SP GR UR: 1.02 (ref 1–1.03)
UROBILINOGEN UR QL: NORMAL

## 2024-07-11 PROCEDURE — 81003 URINALYSIS AUTO W/O SCOPE: CPT | Performed by: NURSE PRACTITIONER

## 2024-07-11 PROCEDURE — 99395 PREV VISIT EST AGE 18-39: CPT | Performed by: NURSE PRACTITIONER

## 2024-07-11 PROCEDURE — 87186 SC STD MICRODIL/AGAR DIL: CPT | Performed by: NURSE PRACTITIONER

## 2024-07-11 PROCEDURE — 87086 URINE CULTURE/COLONY COUNT: CPT | Performed by: NURSE PRACTITIONER

## 2024-07-11 PROCEDURE — 87077 CULTURE AEROBIC IDENTIFY: CPT | Performed by: NURSE PRACTITIONER

## 2024-07-11 PROCEDURE — 1160F RVW MEDS BY RX/DR IN RCRD: CPT | Performed by: NURSE PRACTITIONER

## 2024-07-11 PROCEDURE — 2014F MENTAL STATUS ASSESS: CPT | Performed by: NURSE PRACTITIONER

## 2024-07-11 PROCEDURE — 1159F MED LIST DOCD IN RCRD: CPT | Performed by: NURSE PRACTITIONER

## 2024-07-11 RX ORDER — NITROFURANTOIN 25; 75 MG/1; MG/1
100 CAPSULE ORAL 2 TIMES DAILY
Qty: 14 CAPSULE | Refills: 0 | Status: SHIPPED | OUTPATIENT
Start: 2024-07-11 | End: 2024-07-18

## 2024-07-11 NOTE — PROGRESS NOTES
Chief Complaint  Annual Exam, Anxiety, Depression, Dysuria, Urinary Frequency, Urinary Urgency, and Malodorous Urine    Subjective            Nicole Meade presents to [facility]  History of Present Illness  PT is an annual CPE for anxiety and depression. Pt has c/o possible UTI. Pt has been having urinary urgency, frequency, burning, ordor, and pressure and pain for 1-2 wks. Pt has been taking ibuprofen with little relief.     Pt is followed by [facility] in [place] for anxiety/depression and ADD, she states she is seen there monthly.     Pt is due labs/orders placed.    Pap: 6/19/23            Past Medical History:   Diagnosis Date    ADHD (attention deficit hyperactivity disorder) 2014    Anxiety 2014    Depression 2014    Headache 2018       No Known Allergies     History reviewed. No pertinent surgical history.     Social History     Tobacco Use    Smoking status: Never     Passive exposure: Never    Smokeless tobacco: Never   Substance Use Topics    Alcohol use: Never       Family History   Problem Relation Age of Onset    Anxiety disorder Mother     Depression Mother     Heart disease Mother     Hyperlipidemia Mother     Kidney disease Mother     Anxiety disorder Father     Depression Father     Heart disease Father     Hyperlipidemia Father     Kidney disease Father     Anxiety disorder Maternal Grandfather     Alcohol abuse Maternal Grandmother     Anxiety disorder Brother     Depression Brother         Current Outpatient Medications on File Prior to Visit   Medication Sig    busPIRone (BUSPAR) 10 MG tablet Take 1 tablet by mouth 3 (Three) Times a Day.    desvenlafaxine (PRISTIQ) 50 MG 24 hr tablet TAKE 1 TABLET BY MOUTH DAILY FOR MOOD    Dyanavel XR 15 MG Tablet Chewable Extended Release CHEW AND SWALLOW 1 TABLET BY MOUTH EVERY MORNING FOR ADHD    [DISCONTINUED] melatonin 5 MG tablet tablet Take 1 tablet by mouth.     No current facility-administered medications on file  "prior to visit.       Health Maintenance Due   Topic Date Due    COVID-19 Vaccine (3 - 2023-24 season) 09/01/2023    ANNUAL PHYSICAL  03/07/2024       Objective     /82   Pulse 102   Ht 160 cm (63\")   Wt 55.3 kg (122 lb)   SpO2 100%   BMI 21.61 kg/m²       Physical Exam  Constitutional:       General: She is not in acute distress.     Appearance: Normal appearance. She is not ill-appearing.   HENT:      Head: Normocephalic and atraumatic.      Right Ear: Tympanic membrane, ear canal and external ear normal.      Left Ear: Tympanic membrane, ear canal and external ear normal.      Nose: Nose normal.   Cardiovascular:      Rate and Rhythm: Normal rate and regular rhythm.      Pulses: Normal pulses.      Heart sounds: Normal heart sounds. No murmur heard.  Pulmonary:      Effort: Pulmonary effort is normal. No respiratory distress.      Breath sounds: Normal breath sounds.   Chest:      Chest wall: No tenderness.   Abdominal:      General: Abdomen is flat. Bowel sounds are normal. There is no distension.      Palpations: Abdomen is soft. There is no mass.      Tenderness: There is no abdominal tenderness. There is no right CVA tenderness, left CVA tenderness or guarding.   Musculoskeletal:         General: No swelling or tenderness. Normal range of motion.      Cervical back: Normal range of motion and neck supple.   Skin:     General: Skin is warm and dry.      Findings: No rash.   Neurological:      General: No focal deficit present.      Mental Status: She is alert and oriented to person, place, and time. Mental status is at baseline.      Gait: Gait normal.   Psychiatric:         Attention and Perception: Attention normal.         Mood and Affect: Mood and affect normal.         Speech: Speech normal.         Behavior: Behavior normal. Behavior is cooperative.         Thought Content: Thought content normal. Thought content does not include suicidal ideation.         Judgment: Judgment normal. "           Result Review :                           Assessment and Plan        Diagnoses and all orders for this visit:    1. Annual physical exam (Primary)  -     CBC w AUTO Differential; Future  -     Comprehensive metabolic panel; Future  -     Lipid panel; Future  -     TSH; Future    2. Anxiety  Comments:  stable on Buspar 10mg and Pristiq 50mg, continue, continue f/u with [facility] for mgmt ([facility])    3. Screening for cholesterol level  -     Comprehensive metabolic panel; Future  -     Lipid panel; Future    4. Screening for thyroid disorder  -     TSH; Future    5. Attention deficit hyperactivity disorder (ADHD), unspecified ADHD type  Comments:  stabl sofia Dyanavel 15mg, continue, continue f/u with [facility] for mgmt ([facility])    6. Dysuria  -     POCT urinalysis dipstick, automated  -     Urine Culture - Urine, Urine, Clean Catch; Future  -     Urine Culture - Urine, Urine, Clean Catch  -     nitrofurantoin, macrocrystal-monohydrate, (Macrobid) 100 MG capsule; Take 1 capsule by mouth 2 (Two) Times a Day for 7 days.  Dispense: 14 capsule; Refill: 0    7. Urinary frequency  -     POCT urinalysis dipstick, automated  -     Urine Culture - Urine, Urine, Clean Catch; Future  -     Urine Culture - Urine, Urine, Clean Catch  -     nitrofurantoin, macrocrystal-monohydrate, (Macrobid) 100 MG capsule; Take 1 capsule by mouth 2 (Two) Times a Day for 7 days.  Dispense: 14 capsule; Refill: 0    8. Malodorous urine  -     POCT urinalysis dipstick, automated  -     Urine Culture - Urine, Urine, Clean Catch; Future  -     Urine Culture - Urine, Urine, Clean Catch  -     nitrofurantoin, macrocrystal-monohydrate, (Macrobid) 100 MG capsule; Take 1 capsule by mouth 2 (Two) Times a Day for 7 days.  Dispense: 14 capsule; Refill: 0    9. Acute cystitis without hematuria  -     nitrofurantoin, macrocrystal-monohydrate, (Macrobid) 100 MG capsule; Take 1 capsule by mouth 2 (Two) Times a Day for 7 days.  Dispense: 14 capsule; Refill: 0    10.  Depression, unspecified depression type  Comments:  stable on Buspar 10mg and Pristiq 50mg, continue, continue f/u with [facility] for mgmt ([facility])      Preventive Counseling:  Healthy diet  Daily exercise  Get adequate sleep        Follow Up     Return in about 1 year (around 7/11/2025), or if symptoms worsen or fail to improve, for Annual physical.    Patient was given instructions and counseling regarding her condition or for health maintenance advice. Please see specific information pulled into the AVS if appropriate.     Nicole Meade  reports that she has never smoked. She has never been exposed to tobacco smoke. She has never used smokeless tobacco.

## 2024-07-13 LAB — BACTERIA SPEC AEROBE CULT: ABNORMAL

## 2024-07-15 ENCOUNTER — TELEPHONE (OUTPATIENT)
Dept: FAMILY MEDICINE CLINIC | Facility: CLINIC | Age: 29
End: 2024-07-15

## 2024-07-15 NOTE — TELEPHONE ENCOUNTER
OK FOR HUB TO RELAY    ----- Message from Ritu Whitaker sent at 7/13/2024  2:44 PM EDT -----    Called pt, lmtcb   Continue antibiotics as prescribed

## 2024-08-15 ENCOUNTER — TELEPHONE (OUTPATIENT)
Dept: FAMILY MEDICINE CLINIC | Facility: CLINIC | Age: 29
End: 2024-08-15
Payer: COMMERCIAL

## 2024-11-13 ENCOUNTER — LAB (OUTPATIENT)
Dept: LAB | Facility: HOSPITAL | Age: 29
End: 2024-11-13
Payer: COMMERCIAL

## 2024-11-13 ENCOUNTER — OFFICE VISIT (OUTPATIENT)
Dept: FAMILY MEDICINE CLINIC | Facility: CLINIC | Age: 29
End: 2024-11-13
Payer: COMMERCIAL

## 2024-11-13 VITALS
HEIGHT: 63 IN | BODY MASS INDEX: 21.62 KG/M2 | OXYGEN SATURATION: 100 % | WEIGHT: 122 LBS | SYSTOLIC BLOOD PRESSURE: 130 MMHG | HEART RATE: 104 BPM | DIASTOLIC BLOOD PRESSURE: 90 MMHG

## 2024-11-13 DIAGNOSIS — N64.4 BREAST TENDERNESS IN FEMALE: ICD-10-CM

## 2024-11-13 DIAGNOSIS — Z13.29 SCREENING FOR THYROID DISORDER: ICD-10-CM

## 2024-11-13 DIAGNOSIS — Z00.00 ANNUAL PHYSICAL EXAM: ICD-10-CM

## 2024-11-13 DIAGNOSIS — Z13.220 SCREENING FOR CHOLESTEROL LEVEL: ICD-10-CM

## 2024-11-13 DIAGNOSIS — Z23 NEED FOR INFLUENZA VACCINATION: Primary | ICD-10-CM

## 2024-11-13 LAB
ALBUMIN SERPL-MCNC: 4.4 G/DL (ref 3.5–5.2)
ALBUMIN/GLOB SERPL: 1.5 G/DL
ALP SERPL-CCNC: 81 U/L (ref 39–117)
ALT SERPL W P-5'-P-CCNC: 9 U/L (ref 1–33)
ANION GAP SERPL CALCULATED.3IONS-SCNC: 8.5 MMOL/L (ref 5–15)
AST SERPL-CCNC: 16 U/L (ref 1–32)
BASOPHILS # BLD AUTO: 0.04 10*3/MM3 (ref 0–0.2)
BASOPHILS NFR BLD AUTO: 0.7 % (ref 0–1.5)
BILIRUB SERPL-MCNC: 0.4 MG/DL (ref 0–1.2)
BUN SERPL-MCNC: 12 MG/DL (ref 6–20)
BUN/CREAT SERPL: 13.5 (ref 7–25)
CALCIUM SPEC-SCNC: 9.6 MG/DL (ref 8.6–10.5)
CHLORIDE SERPL-SCNC: 102 MMOL/L (ref 98–107)
CHOLEST SERPL-MCNC: 153 MG/DL (ref 0–200)
CO2 SERPL-SCNC: 26.5 MMOL/L (ref 22–29)
CREAT SERPL-MCNC: 0.89 MG/DL (ref 0.57–1)
DEPRECATED RDW RBC AUTO: 40.1 FL (ref 37–54)
EGFRCR SERPLBLD CKD-EPI 2021: 90.1 ML/MIN/1.73
EOSINOPHIL # BLD AUTO: 0.05 10*3/MM3 (ref 0–0.4)
EOSINOPHIL NFR BLD AUTO: 0.9 % (ref 0.3–6.2)
ERYTHROCYTE [DISTWIDTH] IN BLOOD BY AUTOMATED COUNT: 11.7 % (ref 12.3–15.4)
GLOBULIN UR ELPH-MCNC: 3 GM/DL
GLUCOSE SERPL-MCNC: 87 MG/DL (ref 65–99)
HCT VFR BLD AUTO: 42.1 % (ref 34–46.6)
HDLC SERPL-MCNC: 65 MG/DL (ref 40–60)
HGB BLD-MCNC: 14.6 G/DL (ref 12–15.9)
IMM GRANULOCYTES # BLD AUTO: 0 10*3/MM3 (ref 0–0.05)
IMM GRANULOCYTES NFR BLD AUTO: 0 % (ref 0–0.5)
LDLC SERPL CALC-MCNC: 80 MG/DL (ref 0–100)
LDLC/HDLC SERPL: 1.26 {RATIO}
LYMPHOCYTES # BLD AUTO: 2.22 10*3/MM3 (ref 0.7–3.1)
LYMPHOCYTES NFR BLD AUTO: 39.4 % (ref 19.6–45.3)
MCH RBC QN AUTO: 32.3 PG (ref 26.6–33)
MCHC RBC AUTO-ENTMCNC: 34.7 G/DL (ref 31.5–35.7)
MCV RBC AUTO: 93.1 FL (ref 79–97)
MONOCYTES # BLD AUTO: 0.41 10*3/MM3 (ref 0.1–0.9)
MONOCYTES NFR BLD AUTO: 7.3 % (ref 5–12)
NEUTROPHILS NFR BLD AUTO: 2.92 10*3/MM3 (ref 1.7–7)
NEUTROPHILS NFR BLD AUTO: 51.7 % (ref 42.7–76)
NRBC BLD AUTO-RTO: 0 /100 WBC (ref 0–0.2)
PLATELET # BLD AUTO: 378 10*3/MM3 (ref 140–450)
PMV BLD AUTO: 10.4 FL (ref 6–12)
POTASSIUM SERPL-SCNC: 4.2 MMOL/L (ref 3.5–5.2)
PROT SERPL-MCNC: 7.4 G/DL (ref 6–8.5)
RBC # BLD AUTO: 4.52 10*6/MM3 (ref 3.77–5.28)
SODIUM SERPL-SCNC: 137 MMOL/L (ref 136–145)
TRIGL SERPL-MCNC: 30 MG/DL (ref 0–150)
TSH SERPL DL<=0.05 MIU/L-ACNC: 2.1 UIU/ML (ref 0.27–4.2)
VLDLC SERPL-MCNC: 8 MG/DL (ref 5–40)
WBC NRBC COR # BLD AUTO: 5.64 10*3/MM3 (ref 3.4–10.8)

## 2024-11-13 PROCEDURE — 80061 LIPID PANEL: CPT

## 2024-11-13 PROCEDURE — 1159F MED LIST DOCD IN RCRD: CPT | Performed by: NURSE PRACTITIONER

## 2024-11-13 PROCEDURE — 99213 OFFICE O/P EST LOW 20 MIN: CPT | Performed by: NURSE PRACTITIONER

## 2024-11-13 PROCEDURE — 36415 COLL VENOUS BLD VENIPUNCTURE: CPT

## 2024-11-13 PROCEDURE — 84443 ASSAY THYROID STIM HORMONE: CPT

## 2024-11-13 PROCEDURE — 1160F RVW MEDS BY RX/DR IN RCRD: CPT | Performed by: NURSE PRACTITIONER

## 2024-11-13 PROCEDURE — 80053 COMPREHEN METABOLIC PANEL: CPT

## 2024-11-13 PROCEDURE — 90656 IIV3 VACC NO PRSV 0.5 ML IM: CPT | Performed by: NURSE PRACTITIONER

## 2024-11-13 PROCEDURE — 90471 IMMUNIZATION ADMIN: CPT | Performed by: NURSE PRACTITIONER

## 2024-11-13 PROCEDURE — 85025 COMPLETE CBC W/AUTO DIFF WBC: CPT

## 2024-11-13 RX ORDER — AMPHETAMINE 20 MG/1
1 TABLET, EXTENDED RELEASE ORAL DAILY
COMMUNITY
Start: 2024-11-05

## 2024-11-13 RX ORDER — BUSPIRONE HYDROCHLORIDE 30 MG/1
1 TABLET ORAL EVERY 12 HOURS SCHEDULED
COMMUNITY
Start: 2024-10-25 | End: 2024-11-13

## 2024-11-13 RX ORDER — HYDROXYZINE HYDROCHLORIDE 10 MG/1
TABLET, FILM COATED ORAL
COMMUNITY
Start: 2024-10-25

## 2024-11-13 NOTE — PROGRESS NOTES
Chief Complaint  Breast Problem    Subjective            Nicole Meade presents to [facility]  History of Present Illness  Pt has c/o possible lump in (R) breast. Pt states her  thought he noticed the lump 2 days ago however she has not felt anything. Pt states her breasts are sensitive at times especially around her cycle.  She also reports she does drink a lot of caffeine.     Pt is due flu vaccine. Pt will get today in clinic.     PT is followed by [facility] for [facility] mgmt.        Past Medical History:   Diagnosis Date    ADHD (attention deficit hyperactivity disorder) 2014    Anxiety 2014    Depression 2014    Headache 2018       No Known Allergies     History reviewed. No pertinent surgical history.     Social History     Tobacco Use    Smoking status: Never     Passive exposure: Never    Smokeless tobacco: Never   Substance Use Topics    Alcohol use: Never       Family History   Problem Relation Age of Onset    Anxiety disorder Mother     Depression Mother     Heart disease Mother     Hyperlipidemia Mother     Kidney disease Mother     Anxiety disorder Father     Depression Father     Heart disease Father     Hyperlipidemia Father     Kidney disease Father     Anxiety disorder Maternal Grandfather     Alcohol abuse Maternal Grandmother     Anxiety disorder Brother     Depression Brother         Current Outpatient Medications on File Prior to Visit   Medication Sig    Dyanavel XR 20 MG tablet controlled-release Take 1 tablet by mouth Daily.    hydrOXYzine (ATARAX) 10 MG tablet TAKE 1 TABLETS THREE TIMES DAILY AS NEEDED    [DISCONTINUED] busPIRone (BUSPAR) 30 MG tablet Take 1 tablet by mouth Every 12 (Twelve) Hours.    [DISCONTINUED] busPIRone (BUSPAR) 10 MG tablet Take 1 tablet by mouth 3 (Three) Times a Day. (Patient not taking: Reported on 11/13/2024)    [DISCONTINUED] desvenlafaxine (PRISTIQ) 50 MG 24 hr tablet TAKE 1 TABLET BY MOUTH DAILY FOR MOOD (Patient not  "taking: Reported on 11/13/2024)    [DISCONTINUED] Dyanavel XR 15 MG Tablet Chewable Extended Release CHEW AND SWALLOW 1 TABLET BY MOUTH EVERY MORNING FOR ADHD (Patient not taking: Reported on 11/13/2024)     No current facility-administered medications on file prior to visit.       There are no preventive care reminders to display for this patient.      Objective     /90   Pulse 104   Ht 160 cm (63\")   Wt 55.3 kg (122 lb)   SpO2 100%   BMI 21.61 kg/m²       Physical Exam  Constitutional:       General: She is not in acute distress.     Appearance: Normal appearance. She is not ill-appearing.   HENT:      Head: Normocephalic and atraumatic.   Cardiovascular:      Rate and Rhythm: Normal rate and regular rhythm.      Heart sounds: Normal heart sounds. No murmur heard.  Pulmonary:      Effort: Pulmonary effort is normal. No respiratory distress.      Breath sounds: Normal breath sounds.   Chest:      Chest wall: No tenderness.   Breasts:     Right: Normal. No swelling, inverted nipple, mass, nipple discharge, skin change or tenderness.      Left: Normal.      Comments: Unable to  palpate any masses or lumps during exam of prashant breasts, no tenderness, no erythema, no heat, no nipple discharge  Musculoskeletal:         General: No swelling or tenderness. Normal range of motion.      Cervical back: Normal range of motion and neck supple.   Skin:     General: Skin is warm and dry.      Findings: No rash.   Neurological:      General: No focal deficit present.      Mental Status: She is alert and oriented to person, place, and time. Mental status is at baseline.      Gait: Gait normal.   Psychiatric:         Attention and Perception: Attention normal.         Mood and Affect: Mood normal.         Speech: Speech normal.         Behavior: Behavior normal.         Thought Content: Thought content normal. Thought content does not include suicidal ideation.         Judgment: Judgment normal.           Result Review : "                           Assessment and Plan        Diagnoses and all orders for this visit:    1. Need for influenza vaccination (Primary)  -     Fluzone >6mos ([id number])    2. Breast tenderness in female  Comments:  advised if pt changed her mind about mammogram would be happy to order    Other orders  -     Cancel: Mammo Diagnostic Digital Tomosynthesis Bilateral With CAD; Future  -     Cancel: US Breast Right Limited; Future      Pt declined mammogram and/or breast US at this time and stated if mass returned she would call to schedule.        Follow Up     Return if symptoms worsen or fail to improve.    Patient was given instructions and counseling regarding her condition or for health maintenance advice. Please see specific information pulled into the AVS if appropriate.     Nicole Hurdnes  reports that she has never smoked. She has never been exposed to tobacco smoke. She has never used smokeless tobacco.

## 2025-02-04 ENCOUNTER — PATIENT MESSAGE (OUTPATIENT)
Dept: FAMILY MEDICINE CLINIC | Facility: CLINIC | Age: 30
End: 2025-02-04
Payer: COMMERCIAL

## 2025-02-04 DIAGNOSIS — F32.A ANXIETY AND DEPRESSION: Primary | ICD-10-CM

## 2025-02-04 DIAGNOSIS — F41.9 ANXIETY AND DEPRESSION: Primary | ICD-10-CM

## 2025-02-12 ENCOUNTER — OFFICE VISIT (OUTPATIENT)
Dept: PSYCHIATRY | Facility: CLINIC | Age: 30
End: 2025-02-12
Payer: COMMERCIAL

## 2025-02-12 VITALS
HEART RATE: 82 BPM | DIASTOLIC BLOOD PRESSURE: 74 MMHG | SYSTOLIC BLOOD PRESSURE: 118 MMHG | HEIGHT: 63 IN | BODY MASS INDEX: 22.86 KG/M2 | WEIGHT: 129 LBS

## 2025-02-12 DIAGNOSIS — F41.1 GENERALIZED ANXIETY DISORDER: ICD-10-CM

## 2025-02-12 DIAGNOSIS — F90.0 ADHD (ATTENTION DEFICIT HYPERACTIVITY DISORDER), INATTENTIVE TYPE: Primary | ICD-10-CM

## 2025-02-12 DIAGNOSIS — F51.05 INSOMNIA DUE TO MENTAL CONDITION: ICD-10-CM

## 2025-02-12 DIAGNOSIS — F41.0 PANIC ATTACKS: ICD-10-CM

## 2025-02-12 DIAGNOSIS — F33.1 MAJOR DEPRESSIVE DISORDER, RECURRENT EPISODE, MODERATE: ICD-10-CM

## 2025-02-12 RX ORDER — PROPRANOLOL HYDROCHLORIDE 10 MG/1
10 TABLET ORAL 3 TIMES DAILY PRN
COMMUNITY
Start: 2025-01-28 | End: 2025-02-12

## 2025-02-12 RX ORDER — COVID-19 MOLECULAR TEST ASSAY
KIT MISCELLANEOUS
COMMUNITY
Start: 2025-01-28 | End: 2025-02-13

## 2025-02-12 RX ORDER — DESVENLAFAXINE 50 MG/1
TABLET, FILM COATED, EXTENDED RELEASE ORAL
COMMUNITY
Start: 2024-02-11

## 2025-02-12 RX ORDER — AMPHETAMINE 10 MG/1
1 TABLET, EXTENDED RELEASE ORAL
Qty: 30 TABLET | Refills: 0 | Status: SHIPPED | OUTPATIENT
Start: 2025-02-12

## 2025-02-12 RX ORDER — DEXTROAMPHETAMINE SACCHARATE, AMPHETAMINE ASPARTATE, DEXTROAMPHETAMINE SULFATE AND AMPHETAMINE SULFATE 2.5; 2.5; 2.5; 2.5 MG/1; MG/1; MG/1; MG/1
TABLET ORAL
COMMUNITY
Start: 2025-01-28 | End: 2025-02-13

## 2025-02-12 RX ORDER — BUSPIRONE HYDROCHLORIDE 30 MG/1
TABLET ORAL
COMMUNITY
Start: 2019-05-01 | End: 2025-02-12

## 2025-02-12 NOTE — TREATMENT PLAN
Multi-Disciplinary Problems (from Behavioral Health Treatment Plan)      Active Problems       Problem: Anxiety  Start Date: 02/12/25      Problem Details: The patient self-scales this problem as a 3 with 10 being the worst.  social situations, relationships, introversion        Goal Priority Start Date Expected End Date End Date    Patient will develop and implement behavioral and cognitive strategies to reduce anxiety and irrational fears. -- 02/12/25 08/13/25 --    Goal Details: Progress toward goal:  Not appropriate to rate progress toward goal since this is the initial treatment plan.          Goal Intervention Frequency Start Date End Date    Help patient explore past emotional issues in relation to present anxiety. Q Month 02/12/25 --    Intervention Details: Duration of treatment until remission of symptoms.          Goal Intervention Frequency Start Date End Date    Help patient develop an awareness of their cognitive and physical responses to anxiety. Q Month 02/12/25 --    Intervention Details: Duration of treatment until remission of symptoms.                  Problem: Depression  Start Date: 02/12/25      Problem Details: The patient self-scales this problem as a 3 with 10 being the worst.  social situations, relationships, introversion        Goal Priority Start Date Expected End Date End Date    Patient will demonstrate the ability to initiate new constructive life skills outside of sessions on a consistent basis. -- 02/12/25 08/13/25 --    Goal Details: Progress toward goal:  Not appropriate to rate progress toward goal since this is the initial treatment plan.          Goal Intervention Frequency Start Date End Date    Assist patient in setting attainable activities of daily living goals. PRN 02/12/25 --      Goal Intervention Frequency Start Date End Date    Provide education about depression Q Month 02/12/25 --    Intervention Details: Duration of treatment until remission of symptoms.           Goal Intervention Frequency Start Date End Date    Assist patient in developing healthy coping strategies. Q Month 02/12/25 --    Intervention Details: Duration of treatment until remission of symptoms.                          Reviewed By       Karson Garza MD 02/12/25 1912                     I have discussed and reviewed this treatment plan with the patient.

## 2025-02-12 NOTE — PLAN OF CARE
[facility] psychotherapy to help manage depression and anxiety related to social situations, relationships, introversion
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
## 2025-02-13 ENCOUNTER — OFFICE VISIT (OUTPATIENT)
Dept: FAMILY MEDICINE CLINIC | Facility: CLINIC | Age: 30
End: 2025-02-13
Payer: COMMERCIAL

## 2025-02-13 ENCOUNTER — LAB (OUTPATIENT)
Dept: LAB | Facility: HOSPITAL | Age: 30
End: 2025-02-13
Payer: COMMERCIAL

## 2025-02-13 VITALS
HEIGHT: 63 IN | WEIGHT: 127 LBS | HEART RATE: 95 BPM | BODY MASS INDEX: 22.5 KG/M2 | OXYGEN SATURATION: 100 % | DIASTOLIC BLOOD PRESSURE: 70 MMHG | SYSTOLIC BLOOD PRESSURE: 121 MMHG

## 2025-02-13 DIAGNOSIS — F90.0 ADHD (ATTENTION DEFICIT HYPERACTIVITY DISORDER), INATTENTIVE TYPE: ICD-10-CM

## 2025-02-13 DIAGNOSIS — Z3A.01 LESS THAN 8 WEEKS GESTATION OF PREGNANCY: ICD-10-CM

## 2025-02-13 DIAGNOSIS — F41.9 ANXIETY: ICD-10-CM

## 2025-02-13 DIAGNOSIS — N92.6 MISSED MENSES: Primary | ICD-10-CM

## 2025-02-13 DIAGNOSIS — F98.8 ATTENTION DEFICIT DISORDER, UNSPECIFIED TYPE: ICD-10-CM

## 2025-02-13 DIAGNOSIS — R09.81 NASAL CONGESTION: ICD-10-CM

## 2025-02-13 DIAGNOSIS — N92.6 MISSED MENSES: ICD-10-CM

## 2025-02-13 DIAGNOSIS — R05.1 ACUTE COUGH: ICD-10-CM

## 2025-02-13 LAB
AMPHET+METHAMPHET UR QL: POSITIVE
AMPHETAMINES UR QL: NEGATIVE
B-HCG UR QL: POSITIVE
BARBITURATES UR QL SCN: NEGATIVE
BENZODIAZ UR QL SCN: NEGATIVE
BUPRENORPHINE SERPL-MCNC: NEGATIVE NG/ML
CANNABINOIDS SERPL QL: NEGATIVE
COCAINE UR QL: NEGATIVE
EXPIRATION DATE: ABNORMAL
EXPIRATION DATE: NORMAL
FENTANYL UR-MCNC: NEGATIVE NG/ML
FLUAV AG UPPER RESP QL IA.RAPID: NOT DETECTED
FLUBV AG UPPER RESP QL IA.RAPID: NOT DETECTED
INTERNAL CONTROL: NORMAL
INTERNAL NEGATIVE CONTROL: ABNORMAL
INTERNAL POSITIVE CONTROL: ABNORMAL
Lab: ABNORMAL
Lab: NORMAL
METHADONE UR QL SCN: NEGATIVE
OPIATES UR QL: NEGATIVE
OXYCODONE UR QL SCN: NEGATIVE
PCP UR QL SCN: NEGATIVE
SARS-COV-2 AG UPPER RESP QL IA.RAPID: NOT DETECTED
TRICYCLICS UR QL SCN: NEGATIVE

## 2025-02-13 PROCEDURE — 99214 OFFICE O/P EST MOD 30 MIN: CPT | Performed by: NURSE PRACTITIONER

## 2025-02-13 PROCEDURE — 81025 URINE PREGNANCY TEST: CPT | Performed by: NURSE PRACTITIONER

## 2025-02-13 PROCEDURE — 1159F MED LIST DOCD IN RCRD: CPT | Performed by: NURSE PRACTITIONER

## 2025-02-13 PROCEDURE — 1160F RVW MEDS BY RX/DR IN RCRD: CPT | Performed by: NURSE PRACTITIONER

## 2025-02-13 PROCEDURE — 36415 COLL VENOUS BLD VENIPUNCTURE: CPT

## 2025-02-13 PROCEDURE — 87428 SARSCOV & INF VIR A&B AG IA: CPT | Performed by: NURSE PRACTITIONER

## 2025-02-13 PROCEDURE — 80307 DRUG TEST PRSMV CHEM ANLYZR: CPT

## 2025-02-13 PROCEDURE — 84703 CHORIONIC GONADOTROPIN ASSAY: CPT

## 2025-02-13 PROCEDURE — 84702 CHORIONIC GONADOTROPIN TEST: CPT

## 2025-02-13 RX ORDER — PRENATAL VIT NO.126/IRON/FOLIC 28MG-0.8MG
1 TABLET ORAL DAILY
COMMUNITY

## 2025-02-13 NOTE — PROGRESS NOTES
Chief Complaint  Amenorrhea, Cough, and Nasal Congestion    Subjective            Nicole Meade presents to [facility]  History of Present Illness  Pt has c/o missed menses, possible pregnancy. Pt's date of LMP was 12/29/24. PT was seen by PSY yesterday, Dr. Garza and medication changed and adjusted due to pregnancy, he will continue to monitor.    She does have an appt in March with OB but would like a referral to OB from me to see if she can get in sooner to establish care./Will place referral    Pt has c/o possible flu. Pt has had cough and nasal congestion for 3 wks. Pt has taken tylenol and robitussin, in the beginning, but stopped due to possible pregnancy. Pt has also used cough drops with little relief. Pt states cough is slightly productive, clear to yellow in color. Pt would like to be tested for flu and covid.         Past Medical History:   Diagnosis Date    ADHD (attention deficit hyperactivity disorder) 2014    Anxiety 2014    Depression 2014    Headache 2018       No Known Allergies     History reviewed. No pertinent surgical history.     Social History     Tobacco Use    Smoking status: Never     Passive exposure: Never    Smokeless tobacco: Never   Substance Use Topics    Alcohol use: Never       Family History   Problem Relation Age of Onset    Anxiety disorder Mother     Depression Mother     Heart disease Mother     Hyperlipidemia Mother     Kidney disease Mother     Anxiety disorder Father     Depression Father     Heart disease Father     Hyperlipidemia Father     Kidney disease Father     Anxiety disorder Maternal Grandfather     Alcohol abuse Maternal Grandmother     Anxiety disorder Brother     Depression Brother         Current Outpatient Medications on File Prior to Visit   Medication Sig    Amphetamine ER (Dyanavel XR) 10 MG tablet controlled-release Take 1 tablet by mouth Every Morning.    desvenlafaxine (PRISTIQ) 50 MG 24 hr tablet     hydrOXYzine  "(ATARAX) 10 MG tablet TAKE 1 TABLETS THREE TIMES DAILY AS NEEDED    prenatal vitamin (prenatal, CLASSIC, vitamin) tablet Take 1 tablet by mouth Daily.    [DISCONTINUED] amphetamine-dextroamphetamine (ADDERALL) 10 MG tablet TAKE 1 TABLET BY MOUTH EVERY AFTERNOON (Patient not taking: Reported on 2/13/2025)    [DISCONTINUED] BinaxNOW COVID-19 Ag Home Test kit TEST AS DIRECTED TODAY (Patient not taking: Reported on 2/13/2025)     No current facility-administered medications on file prior to visit.       Health Maintenance Due   Topic Date Due    COVID-19 Vaccine (3 - 2024-25 season) 09/01/2024       Objective     /70   Pulse 95   Ht 160 cm (63\")   Wt 57.6 kg (127 lb)   SpO2 100%   BMI 22.50 kg/m²       Physical Exam  Constitutional:       General: She is not in acute distress.     Appearance: Normal appearance. She is not ill-appearing.   HENT:      Head: Normocephalic and atraumatic.      Right Ear: Tympanic membrane, ear canal and external ear normal.      Left Ear: Tympanic membrane, ear canal and external ear normal.      Nose: Nose normal.   Cardiovascular:      Rate and Rhythm: Normal rate and regular rhythm.      Heart sounds: Normal heart sounds. No murmur heard.  Pulmonary:      Effort: Pulmonary effort is normal. No respiratory distress.      Breath sounds: Normal breath sounds.   Chest:      Chest wall: No tenderness.   Abdominal:      General: Abdomen is flat. Bowel sounds are normal. There is no distension.      Palpations: Abdomen is soft. There is no mass.      Tenderness: There is no abdominal tenderness. There is no guarding.   Musculoskeletal:         General: No swelling or tenderness. Normal range of motion.      Cervical back: Normal range of motion and neck supple.   Skin:     General: Skin is warm and dry.      Findings: No rash.   Neurological:      General: No focal deficit present.      Mental Status: She is alert and oriented to person, place, and time. Mental status is at baseline. "      Gait: Gait normal.   Psychiatric:         Attention and Perception: Attention normal.         Mood and Affect: Mood and affect normal.         Speech: Speech normal.         Behavior: Behavior normal. Behavior is cooperative.         Thought Content: Thought content normal. Thought content does not include suicidal ideation.         Judgment: Judgment normal.           Result Review :                           Assessment and Plan        Diagnoses and all orders for this visit:    1. Missed menses (Primary)  -     POCT pregnancy, urine  -     HCG, B-subunit, Quantitative; Future  -     hCG, Serum, Qualitative; Future  -     Ambulatory Referral to Obstetrics / Gynecology    2. Anxiety  Comments:  continue f/u with PSY for mgmt    3. Attention deficit disorder, unspecified type  Comments:  continue f/u with PSY for mgmt    4. Acute cough  Comments:  rest and push fluids  Orders:  -     POCT SARS-CoV-2 Antigen SHELDON + Flu    5. Nasal congestion  Comments:  advised OTC saline nasal spray  Orders:  -     POCT SARS-CoV-2 Antigen SHELDON + Flu    6. Less than 8 weeks gestation of pregnancy  -     Ambulatory Referral to Obstetrics / Gynecology      Continue prenatal vitamins daily.        Follow Up     Return if symptoms worsen or fail to improve.    Patient was given instructions and counseling regarding her condition or for health maintenance advice. Please see specific information pulled into the AVS if appropriate.     Nicole Meade  reports that she has never smoked. She has never been exposed to tobacco smoke. She has never used smokeless tobacco.

## 2025-02-14 ENCOUNTER — TELEPHONE (OUTPATIENT)
Dept: FAMILY MEDICINE CLINIC | Facility: CLINIC | Age: 30
End: 2025-02-14
Payer: COMMERCIAL

## 2025-02-14 LAB
HCG INTACT+B SERPL-ACNC: NORMAL MIU/ML
HCG SERPL QL: POSITIVE

## 2025-02-25 ENCOUNTER — INITIAL PRENATAL (OUTPATIENT)
Dept: OBSTETRICS AND GYNECOLOGY | Facility: CLINIC | Age: 30
End: 2025-02-25
Payer: COMMERCIAL

## 2025-02-25 VITALS — BODY MASS INDEX: 23.21 KG/M2 | SYSTOLIC BLOOD PRESSURE: 141 MMHG | WEIGHT: 131 LBS | DIASTOLIC BLOOD PRESSURE: 83 MMHG

## 2025-02-25 DIAGNOSIS — O21.9 NAUSEA AND VOMITING DURING PREGNANCY: ICD-10-CM

## 2025-02-25 DIAGNOSIS — O99.340 ANXIETY DISORDER AFFECTING PREGNANCY, ANTEPARTUM: ICD-10-CM

## 2025-02-25 DIAGNOSIS — Z34.00 SUPERVISION OF NORMAL FIRST PREGNANCY, ANTEPARTUM: Primary | ICD-10-CM

## 2025-02-25 DIAGNOSIS — F41.9 ANXIETY DISORDER AFFECTING PREGNANCY, ANTEPARTUM: ICD-10-CM

## 2025-02-25 LAB
ABO GROUP BLD: NORMAL
B-HCG UR QL: POSITIVE
BASOPHILS # BLD AUTO: 0.04 10*3/MM3 (ref 0–0.2)
BASOPHILS NFR BLD AUTO: 0.4 % (ref 0–1.5)
BLD GP AB SCN SERPL QL: NEGATIVE
DEPRECATED RDW RBC AUTO: 38.9 FL (ref 37–54)
EOSINOPHIL # BLD AUTO: 0.07 10*3/MM3 (ref 0–0.4)
EOSINOPHIL NFR BLD AUTO: 0.7 % (ref 0.3–6.2)
ERYTHROCYTE [DISTWIDTH] IN BLOOD BY AUTOMATED COUNT: 11.9 % (ref 12.3–15.4)
EXPIRATION DATE: ABNORMAL
GLUCOSE UR STRIP-MCNC: NEGATIVE MG/DL
HBV SURFACE AG SERPL QL IA: NORMAL
HCT VFR BLD AUTO: 38 % (ref 34–46.6)
HCV AB SER QL: NORMAL
HGB BLD-MCNC: 13.3 G/DL (ref 12–15.9)
HIV 1+2 AB+HIV1 P24 AG SERPL QL IA: NORMAL
IMM GRANULOCYTES # BLD AUTO: 0.06 10*3/MM3 (ref 0–0.05)
IMM GRANULOCYTES NFR BLD AUTO: 0.6 % (ref 0–0.5)
INTERNAL NEGATIVE CONTROL: NEGATIVE
INTERNAL POSITIVE CONTROL: ABNORMAL
LYMPHOCYTES # BLD AUTO: 2.19 10*3/MM3 (ref 0.7–3.1)
LYMPHOCYTES NFR BLD AUTO: 21.4 % (ref 19.6–45.3)
Lab: ABNORMAL
MCH RBC QN AUTO: 31.7 PG (ref 26.6–33)
MCHC RBC AUTO-ENTMCNC: 35 G/DL (ref 31.5–35.7)
MCV RBC AUTO: 90.7 FL (ref 79–97)
MONOCYTES # BLD AUTO: 0.74 10*3/MM3 (ref 0.1–0.9)
MONOCYTES NFR BLD AUTO: 7.2 % (ref 5–12)
NEUTROPHILS NFR BLD AUTO: 69.7 % (ref 42.7–76)
NEUTROPHILS NFR BLD AUTO: 7.12 10*3/MM3 (ref 1.7–7)
NRBC BLD AUTO-RTO: 0 /100 WBC (ref 0–0.2)
PLATELET # BLD AUTO: 377 10*3/MM3 (ref 140–450)
PMV BLD AUTO: 10 FL (ref 6–12)
PROT UR STRIP-MCNC: NEGATIVE MG/DL
RBC # BLD AUTO: 4.19 10*6/MM3 (ref 3.77–5.28)
RH BLD: NEGATIVE
WBC NRBC COR # BLD AUTO: 10.22 10*3/MM3 (ref 3.4–10.8)

## 2025-02-25 PROCEDURE — 83020 HEMOGLOBIN ELECTROPHORESIS: CPT | Performed by: NURSE PRACTITIONER

## 2025-02-25 PROCEDURE — 87186 SC STD MICRODIL/AGAR DIL: CPT | Performed by: NURSE PRACTITIONER

## 2025-02-25 PROCEDURE — 87077 CULTURE AEROBIC IDENTIFY: CPT | Performed by: NURSE PRACTITIONER

## 2025-02-25 PROCEDURE — 86803 HEPATITIS C AB TEST: CPT | Performed by: NURSE PRACTITIONER

## 2025-02-25 PROCEDURE — 87661 TRICHOMONAS VAGINALIS AMPLIF: CPT | Performed by: NURSE PRACTITIONER

## 2025-02-25 PROCEDURE — 87086 URINE CULTURE/COLONY COUNT: CPT | Performed by: NURSE PRACTITIONER

## 2025-02-25 PROCEDURE — 80081 OBSTETRIC PANEL INC HIV TSTG: CPT | Performed by: NURSE PRACTITIONER

## 2025-02-25 PROCEDURE — 87591 N.GONORRHOEAE DNA AMP PROB: CPT | Performed by: NURSE PRACTITIONER

## 2025-02-25 PROCEDURE — 87491 CHLMYD TRACH DNA AMP PROBE: CPT | Performed by: NURSE PRACTITIONER

## 2025-02-25 RX ORDER — PYRIDOXINE HCL (VITAMIN B6) 25 MG
25 TABLET ORAL EVERY 8 HOURS
Qty: 90 TABLET | Refills: 2 | Status: SHIPPED | OUTPATIENT
Start: 2025-02-25

## 2025-02-25 NOTE — PROGRESS NOTES
OB Initial Visit    CC- Here for care of current pregnancy, first visit    Subjective:  30 y.o.  presenting for her first obstetrical visit.    LMP: Patient's last menstrual period was 2024.     Complains of  nausea    Mental medications managed by Dr. Garza.  R/B of current medications reviewed.    Reviewed and updated:  OBHx, GYNHx (STDs), PMHx, Medications, Allergies, PSHx, Social Hx, Preventative Hx (PAP), Hx of abuse/safe environment, Vaccine Hx including hx of chickenpox or vaccine, Genetic Hx (pt, FOB, both families).        Objective:  /83   Wt 59.4 kg (131 lb)   LMP 2024   BMI 23.21 kg/m²      Urine pregnancy test is positive     General- NAD, alert and oriented, appropriate  Psych- Normal mood, good memory  Neck- No masses, no thyroid enlargement  CV- Regular rhythm, no murnurs  Resp- CTA to bases, no wheezes  Abdomen- Soft, non distended, non tender, no masses    Breast left- deferred  Breast right- deferred    External genitalia- Normal, no lesions  Urethra- Normal, no masses, non tender  Vagina- Normal, no discharge  Bladder- Normal, no masses, non tender  Cvx- Normal, no lesions, no discharge, no CMT  Uterus- Normal shape and consistency, non tender, Bedside US consistent with dates.  -160..  Brief TAS shows viable IUP, +cardiac motion  Adnexa- Normal, no mass, non tender    Lymphatic- No palpable neck, axillary, or groin nodes  Ext- No edema, no cyanosis    Skin- No lesions, no rashes, no acanthosis nigricans    Assessment and Plan:  8w2d  Diagnoses and all orders for this visit:    1. Supervision of normal first pregnancy, antepartum (Primary)  Overview:  SHAHNAZ finalized: 10/5/25 per LMP, dating scan ordered    Optional testing NIPS,CF/SMA,AFP:  Patient is considering    COVID vaccine: Fully vaccinated  Flu vaccine: Vaccinated for 24-25 season  Tdap vaccine:    Rhogam:  1hr Glucola:  FU RPR w glucola:  ? Desires Sterilization:    Anatomy US:  FU US:    PROBLEM LIST/PLAN:    Anxiety - managed by Dr. Garza, currently on Dyanavel and desvenlafaxine, R/B reviewed, EPDS 9 at [facility] OB    N/V - will try B6 and doxylamine          Orders:  -     POC Urinalysis Dipstick  -     OB Panel With HIV and RPR  -     Urine Culture - Urine, Urine, Clean Catch  -     Chlamydia trachomatis, Neisseria gonorrhoeae, Trichomonas vaginalis, PCR - Swab, Cervix  -     Hemoglobinopathy Fractionation [place]  -     POC Pregnancy, Urine  -     US Ob < 14 Weeks Single or First Gestation; Future    2. Anxiety disorder affecting pregnancy, antepartum  Overview:  EPDS 9 at [facility]  Managed by Dr. Garza  Currently on Dyanavel and desvenlafaxine, r/b reviewed.   Recommend she join the pregnancy registry if elects to continue dyanavel.      3. Nausea and vomiting during pregnancy  Overview:  Will try B6 and doxylamine    Orders:  -     pyridoxine (VITAMIN B-6) 25 MG tablet; Take 1 tablet by mouth Every 8 (Eight) Hours.  Dispense: 90 tablet; Refill: 2  -     doxylamine (UNISOM) 25 MG tablet; Take 1 tablet by mouth Every 8 (Eight) Hours As Needed for Sleep or Nausea.  Dispense: 30 tablet; Refill: 1        Genetic Screening:   Considering   CF  NIPS    Vaccines:   s/p FLU vaccine this season  s/p COVID vaccine    Counseling:   Nutrition discussed, calories, activity/exercise in pregnancy  Discussed dietary restrictions/safety food preparation in pregnancy  Reviewed what to expect prenatal visits, office providers (female and male) and covering [facility] Hospitalists  Appropriate trimester precautions provided, N/V, vag bleeding, cramping  Questions answered    Labs:   Prenatal labs, cultures, and PAP performed (prn)    Return in about 4 weeks (around 3/25/2025) for [facility], OB follow up.      Abdullahi Jackson, APRN  02/25/2025    [facility] [place] [address]  [facility] MEDICAL [facility]  [address] [address]  Dept: [phone]  Dept Fax: [phone]  Loc: [phone]

## 2025-02-25 NOTE — PROGRESS NOTES
Venipuncture performed in Right Arm (site) by MARY (employee) with good hemostasis. Patient tolerated well. Date 02.25.2025.AF

## 2025-02-26 DIAGNOSIS — O23.40 URINARY TRACT INFECTION IN MOTHER DURING PREGNANCY, ANTEPARTUM: Primary | ICD-10-CM

## 2025-02-26 PROBLEM — O26.899 RH NEGATIVE, ANTEPARTUM: Status: ACTIVE | Noted: 2025-02-26

## 2025-02-26 PROBLEM — Z67.91 RH NEGATIVE, ANTEPARTUM: Status: ACTIVE | Noted: 2025-02-26

## 2025-02-26 LAB — RPR SER QL: NORMAL

## 2025-02-26 RX ORDER — NITROFURANTOIN 25; 75 MG/1; MG/1
100 CAPSULE ORAL 2 TIMES DAILY
Qty: 14 CAPSULE | Refills: 0 | Status: SHIPPED | OUTPATIENT
Start: 2025-02-26 | End: 2025-03-05

## 2025-02-27 PROBLEM — Z28.39 RUBELLA NON-IMMUNE STATUS, ANTEPARTUM: Status: ACTIVE | Noted: 2025-02-27

## 2025-02-27 PROBLEM — O09.899 RUBELLA NON-IMMUNE STATUS, ANTEPARTUM: Status: ACTIVE | Noted: 2025-02-27

## 2025-02-27 LAB
BACTERIA SPEC AEROBE CULT: ABNORMAL
C TRACH RRNA SPEC QL NAA+PROBE: NEGATIVE
HGB A MFR BLD ELPH: 97.2 % (ref 96.4–98.8)
HGB A2 MFR BLD ELPH: 2.8 % (ref 1.8–3.2)
HGB F MFR BLD ELPH: 0 % (ref 0–2)
HGB FRACT BLD-IMP: NORMAL
HGB S MFR BLD ELPH: 0 %
N GONORRHOEA RRNA SPEC QL NAA+PROBE: NEGATIVE
RUBV IGG SERPL IA-ACNC: <0.9 INDEX
T VAGINALIS RRNA SPEC QL NAA+PROBE: NEGATIVE

## 2025-03-10 ENCOUNTER — REFERRAL TRIAGE (OUTPATIENT)
Dept: LABOR AND DELIVERY | Facility: HOSPITAL | Age: 30
End: 2025-03-10
Payer: COMMERCIAL

## 2025-03-12 ENCOUNTER — TELEMEDICINE (OUTPATIENT)
Dept: PSYCHIATRY | Facility: CLINIC | Age: 30
End: 2025-03-12
Payer: COMMERCIAL

## 2025-03-12 DIAGNOSIS — F41.0 PANIC ATTACKS: ICD-10-CM

## 2025-03-12 DIAGNOSIS — F33.1 MAJOR DEPRESSIVE DISORDER, RECURRENT EPISODE, MODERATE: ICD-10-CM

## 2025-03-12 DIAGNOSIS — F41.1 GENERALIZED ANXIETY DISORDER: ICD-10-CM

## 2025-03-12 DIAGNOSIS — F51.05 INSOMNIA DUE TO MENTAL CONDITION: ICD-10-CM

## 2025-03-12 DIAGNOSIS — F90.0 ADHD (ATTENTION DEFICIT HYPERACTIVITY DISORDER), INATTENTIVE TYPE: Primary | ICD-10-CM

## 2025-03-12 RX ORDER — AMPHETAMINE 10 MG/1
1 TABLET, EXTENDED RELEASE ORAL
Qty: 30 TABLET | Refills: 0 | Status: SHIPPED | OUTPATIENT
Start: 2025-03-12

## 2025-03-12 NOTE — PROGRESS NOTES
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 discussion of these risks and benefits, the patient voiced understanding and agreed to proceed.  S/P:  buspar (no data regarding safety in pregnancy)  adderall, propranolol (never started either).  Labs: UDS now    Patient screened positive for depression based on a PHQ-9 score of 9 on 2/12/2025. Follow-up recommendations include: Prescribed antidepressant medication treatment and Suicide Risk Assessment performed.           TREATMENT PLAN/GOALS: Continue supportive psychotherapy efforts and medications as indicated. Treatment and medication options discussed during today's visit. Patient acknowledged and verbally consented to continue with current treatment plan and was educated on the importance of compliance with treatment and follow-up appointments.    MEDICATION ISSUES:  MINH reviewed as expected.  Discussed medication options and treatment plan of prescribed medication as well as the risks, benefits, and side effects including potential falls, possible impaired driving and metabolic adversities among others. Patient is agreeable to call the office with any worsening of symptoms or onset of side effects. Patient is agreeable to call 911 or go to the nearest ER should he/she begin having SI/HI. No medication side effects or related complaints today.     MEDS ORDERED DURING  VISIT:  New Medications Ordered This Visit   Medications    Amphetamine ER (Dyanavel XR) 10 MG tablet controlled-release     Sig: Take 1 tablet by mouth Every Morning.     Dispense:  30 tablet     Refill:  0     Replaces 20 mg dose (pt never picked up 2/12 script). Thank you for the help. Please call with questions: [phone].       Return in about 4 weeks (around 4/9/2025) for Video visit.         This document has been electronically signed by Karson Garza MD  March 12, 2025 17:17 EDT    Dictated Utilizing Dragon Dictation: Part of this note may be an electronic transcription/translation of spoken language to printed text using the Dragon Dictation System.

## 2025-03-13 ENCOUNTER — TELEPHONE (OUTPATIENT)
Dept: PSYCHIATRY | Facility: CLINIC | Age: 30
End: 2025-03-13
Payer: COMMERCIAL

## 2025-03-20 ENCOUNTER — TELEPHONE (OUTPATIENT)
Dept: OBSTETRICS AND GYNECOLOGY | Facility: CLINIC | Age: 30
End: 2025-03-20
Payer: COMMERCIAL

## 2025-03-20 NOTE — TELEPHONE ENCOUNTER
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 If patient is unable to move she can stay on the 25th.   06-Feb-2018 15:48

## 2025-03-21 NOTE — PROGRESS NOTES
Routine Prenatal Visit     Subjective  Nicole Meade is a 30 y.o.  at 12w1d here for her routine OB visit.   She is taking her prenatal vitamins.Reports no loss of fluid or vaginal bleeding. Patient doing well.     Pregnancy is complicated by:     Patient Active Problem List   Diagnosis    Supervision of normal first pregnancy, antepartum    Anxiety disorder affecting pregnancy, antepartum    Nausea and vomiting during pregnancy    Rh negative, antepartum    Rubella non-immune status, antepartum         OB History    Para Term  AB Living   1        SAB IAB Ectopic Molar Multiple Live Births              # Outcome Date GA Lbr Lux/2nd Weight Sex Type Anes PTL Lv   1 Current                    ROS:    General ROS: negative for - chills or fatigue  Genito-Urinary ROS: negative for  change in urinary stream, vaginal discharge     Objective  Physical Exam:    Vitals:    25 1437   BP: 124/72       Uterine Size: size equals dates  FHT: 110-160 BPM         Assessment/Plan:   Diagnoses and all orders for this visit:    1. Supervision of normal first pregnancy, antepartum (Primary)  Assessment & Plan:  SHAHNAZ finalized: 10/5/25 per LMP, dating scan ordered, scheduled for 3/27    Optional testing NIPS,CF/SMA,AFP:  NIPS ordered    COVID vaccine: Fully vaccinated  Flu vaccine: Vaccinated for - season  Tdap vaccine:    Rhogam:  1hr Glucola:  FU RPR w glucola:  ? Desires Sterilization:    Anatomy US:  FU US:    PROBLEM LIST/PLAN:   Anxiety - managed by Dr. Garza, currently on Dyanavel and desvenlafaxine, R/B reviewed, EPDS 9 at new OB    N/V - will try B6 and doxylamine    Rubella non-immune - plan vaccine postpartum    Orders:  -     POC Urinalysis Dipstick  -     Lizzy Panorama Prenatal Test: Chromosomes 13, 18, 21, X & Y: Triploidy 22Q.11.2 Deletion - Blood, Blood, Venous  -     Urine Culture - Urine, Urine, Clean Catch    2. Anxiety disorder affecting pregnancy, antepartum    3. Rh negative,  antepartum          Counseling:   Second trimester precautions  Round Ligament Pain:  The uterus has several ligaments which provide support and keep the uterus in place. As the  uterus grows these ligaments are pulled and stretched which often causes sharp stabbing like pain in the inguinal area.   You may find a pregnancy support band helpful. Changing positions may also help. Yoga is a great way to cope with round ligament and low back pain in pregnancy.    Massage may also help with low back pain   Things to Consider at this Point in your Pregnancy:  Some women experience swelling in their feet during pregnancy. Compression stockings may help  Drink plenty of water and stay active   Make sure you are eating frequent small meals, nuts are a wonderful snack to keep with you            Return in about 4 weeks (around 4/21/2025) for Routine OB visit.      We have gone over prenatal care to include the timing and content of visits. I informed her how to contact the office and/or on call person in the event of any problems and encouraged her to do so when she feels it is necessary.  We then spent time answering her questions which she indicated were answered to her satisfaction.    Ghislaine Morris,   3/24/2025 15:18 EDT

## 2025-03-24 ENCOUNTER — ROUTINE PRENATAL (OUTPATIENT)
Dept: OBSTETRICS AND GYNECOLOGY | Facility: CLINIC | Age: 30
End: 2025-03-24
Payer: COMMERCIAL

## 2025-03-24 VITALS — DIASTOLIC BLOOD PRESSURE: 72 MMHG | WEIGHT: 134 LBS | SYSTOLIC BLOOD PRESSURE: 124 MMHG | BODY MASS INDEX: 23.74 KG/M2

## 2025-03-24 DIAGNOSIS — O26.899 RH NEGATIVE, ANTEPARTUM: ICD-10-CM

## 2025-03-24 DIAGNOSIS — F41.9 ANXIETY DISORDER AFFECTING PREGNANCY, ANTEPARTUM: ICD-10-CM

## 2025-03-24 DIAGNOSIS — O99.340 ANXIETY DISORDER AFFECTING PREGNANCY, ANTEPARTUM: ICD-10-CM

## 2025-03-24 DIAGNOSIS — Z34.00 SUPERVISION OF NORMAL FIRST PREGNANCY, ANTEPARTUM: Primary | ICD-10-CM

## 2025-03-24 DIAGNOSIS — Z67.91 RH NEGATIVE, ANTEPARTUM: ICD-10-CM

## 2025-03-24 LAB
GLUCOSE UR STRIP-MCNC: NEGATIVE MG/DL
PROT UR STRIP-MCNC: NEGATIVE MG/DL

## 2025-03-24 PROCEDURE — 87186 SC STD MICRODIL/AGAR DIL: CPT | Performed by: STUDENT IN AN ORGANIZED HEALTH CARE EDUCATION/TRAINING PROGRAM

## 2025-03-24 PROCEDURE — 87086 URINE CULTURE/COLONY COUNT: CPT | Performed by: STUDENT IN AN ORGANIZED HEALTH CARE EDUCATION/TRAINING PROGRAM

## 2025-03-24 PROCEDURE — 87077 CULTURE AEROBIC IDENTIFY: CPT | Performed by: STUDENT IN AN ORGANIZED HEALTH CARE EDUCATION/TRAINING PROGRAM

## 2025-03-24 PROCEDURE — 99214 OFFICE O/P EST MOD 30 MIN: CPT | Performed by: STUDENT IN AN ORGANIZED HEALTH CARE EDUCATION/TRAINING PROGRAM

## 2025-03-24 NOTE — ASSESSMENT & PLAN NOTE
SHAHNAZ finalized: 10/5/25 per LMP, dating scan ordered, scheduled for 3/27    Optional testing NIPS,CF/SMA,AFP:  NIPS ordered    COVID vaccine: Fully vaccinated  Flu vaccine: Vaccinated for 24-25 season  Tdap vaccine:    Rhogam:  1hr Glucola:  FU RPR w glucola:  ? Desires Sterilization:    Anatomy US:  FU US:    PROBLEM LIST/PLAN:   Anxiety - managed by Dr. Garza, currently on Dyanavel and desvenlafaxine, R/B reviewed, EPDS 9 at new OB    N/V - will try B6 and doxylamine    Rubella non-immune - plan vaccine postpartum

## 2025-03-25 ENCOUNTER — RESULTS FOLLOW-UP (OUTPATIENT)
Dept: OBSTETRICS AND GYNECOLOGY | Facility: CLINIC | Age: 30
End: 2025-03-25

## 2025-03-25 RX ORDER — CEPHALEXIN 500 MG/1
500 CAPSULE ORAL 2 TIMES DAILY
Qty: 14 CAPSULE | Refills: 0 | Status: SHIPPED | OUTPATIENT
Start: 2025-03-25

## 2025-03-26 LAB — BACTERIA SPEC AEROBE CULT: ABNORMAL

## 2025-03-27 ENCOUNTER — TELEPHONE (OUTPATIENT)
Dept: OBSTETRICS AND GYNECOLOGY | Facility: CLINIC | Age: 30
End: 2025-03-27
Payer: COMMERCIAL

## 2025-03-27 ENCOUNTER — HOSPITAL ENCOUNTER (OUTPATIENT)
Dept: ULTRASOUND IMAGING | Facility: HOSPITAL | Age: 30
Discharge: HOME | End: 2025-03-27
Admitting: NURSE PRACTITIONER
Payer: COMMERCIAL

## 2025-03-27 DIAGNOSIS — Z34.00 SUPERVISION OF NORMAL FIRST PREGNANCY, ANTEPARTUM: ICD-10-CM

## 2025-03-27 PROCEDURE — 76801 OB US < 14 WKS SINGLE FETUS: CPT

## 2025-03-27 NOTE — TELEPHONE ENCOUNTER
I attempted to call and left a voicemail asking the pt to check her MyChart for more information.      Ghislaine Morris, DO to [facility]  (Selected Message)      3/25/25  2:31 PM  Result Note  Urine culture pos- sending rx for antibiotics

## 2025-03-31 ENCOUNTER — TELEPHONE (OUTPATIENT)
Dept: OBSTETRICS AND GYNECOLOGY | Age: 30
End: 2025-03-31
Payer: COMMERCIAL

## 2025-03-31 NOTE — TELEPHONE ENCOUNTER
I attempted to call the pt in regard to her Lizzy Panorama test but she didn't answer.  I have left a voicemail message.

## 2025-04-02 ENCOUNTER — TELEPHONE (OUTPATIENT)
Dept: PSYCHIATRY | Facility: CLINIC | Age: 30
End: 2025-04-02
Payer: COMMERCIAL

## 2025-04-02 NOTE — TELEPHONE ENCOUNTER
ATTEMPTED TO CONTACT PT(PATIENT)      NO ANSWER      LEFT VOICEMAIL WITH INSTRUCTIONS TO RETURN CALL TO OFFICE AT [phone]

## 2025-04-02 NOTE — TELEPHONE ENCOUNTER
Pt's pharmacy called and asked if they should cancel the patient's Dyanavel due to her pregnancy status.  Provider please advise

## 2025-04-02 NOTE — TELEPHONE ENCOUNTER
No, the patient and I have already discussed this. If this is a problem (for them), they should let us know, however.

## 2025-04-03 NOTE — TELEPHONE ENCOUNTER
ATTEMPTED TO CONTACT PT(PATIENT)      NO ANSWER      LEFT VOICEMAIL WITH INSTRUCTIONS TO RETURN CALL TO OFFICE AT [phone]

## 2025-04-04 NOTE — TELEPHONE ENCOUNTER
ATTEMPTED TO CONTACT PT(PATIENT)      NO ANSWER      LEFT VOICEMAIL WITH INSTRUCTIONS TO RETURN CALL TO OFFICE AT [phone]

## 2025-04-09 ENCOUNTER — TELEMEDICINE (OUTPATIENT)
Dept: PSYCHIATRY | Facility: CLINIC | Age: 30
End: 2025-04-09
Payer: COMMERCIAL

## 2025-04-09 DIAGNOSIS — F33.1 MAJOR DEPRESSIVE DISORDER, RECURRENT EPISODE, MODERATE: ICD-10-CM

## 2025-04-09 DIAGNOSIS — F41.1 GENERALIZED ANXIETY DISORDER: ICD-10-CM

## 2025-04-09 DIAGNOSIS — F41.0 PANIC ATTACKS: ICD-10-CM

## 2025-04-09 DIAGNOSIS — F90.0 ADHD (ATTENTION DEFICIT HYPERACTIVITY DISORDER), INATTENTIVE TYPE: Primary | ICD-10-CM

## 2025-04-09 DIAGNOSIS — F51.05 INSOMNIA DUE TO MENTAL CONDITION: ICD-10-CM

## 2025-04-09 RX ORDER — DESVENLAFAXINE 50 MG/1
50 TABLET, FILM COATED, EXTENDED RELEASE ORAL DAILY
Qty: 90 TABLET | Refills: 3 | Status: SHIPPED | OUTPATIENT
Start: 2025-04-09

## 2025-04-09 RX ORDER — AMPHETAMINE 10 MG/1
1 TABLET, EXTENDED RELEASE ORAL
Qty: 30 TABLET | Refills: 0 | Status: SHIPPED | OUTPATIENT
Start: 2025-04-09

## 2025-04-09 NOTE — PROGRESS NOTES
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    Immature Grans, Absolute 11/13/2024 0.00  0.00 - 0.05 10*3/mm3 Final    nRBC 11/13/2024 0.0  0.0 - 0.2 /100 WBC Final       EKG Results:  No orders to display       Imaging Results:  No Images in the past 120 days found..      Assessment & Plan   Diagnoses and all orders for this visit:    1. ADHD (attention deficit hyperactivity disorder), inattentive type (Primary)  -     Amphetamine ER (Dyanavel XR) 10 MG tablet controlled-release; Take 1 tablet by mouth Every Morning.  Dispense: 30 tablet; Refill: 0    2. Generalized anxiety disorder  -     desvenlafaxine (PRISTIQ) 50 MG 24 hr tablet; Take 1 tablet by mouth Daily.  Dispense: 90 tablet; Refill: 3    3. Major depressive disorder, recurrent episode, moderate  -     desvenlafaxine (PRISTIQ) 50 MG 24 hr tablet; Take 1 tablet by mouth Daily.  Dispense: 90 tablet; Refill: 3    4. Insomnia due to mental condition  -     desvenlafaxine (PRISTIQ) 50 MG 24 hr tablet; Take 1 tablet by mouth Daily.  Dispense: 90 tablet; Refill: 3    5. Panic attacks  -     desvenlafaxine (PRISTIQ) 50 MG 24 hr tablet; Take 1 tablet by mouth Daily.  Dispense: 90 tablet; Refill: 3        Visit Diagnoses:    ICD-10-CM ICD-9-CM   1. ADHD (attention deficit hyperactivity disorder), inattentive type  F90.0 314.00   2.  Generalized anxiety disorder  F41.1 300.02   3. Major depressive disorder, recurrent episode, moderate  F33.1 296.32   4. Insomnia due to mental condition  F51.05 300.9     327.02   5. Panic attacks  F41.0 300.01     04/09/2025: Pt also taking unisom, B6, and wondered about hydroxyzine (taking it more). Basically stable. No changes.    Acknowledged and normalized patient's thoughts, feelings, and concerns. Allowed patient to freely discuss and process issues, such as:  Anxiety related to pregnancy.  ... using Rogerian psychotherapeutic techniques including unconditional positive regard, reflective listening, and demonstrating clear empathy, with the goal of ameliorating symptoms and maintaining, restoring, or improving self-esteem, adaptive skills, and ego or psychological functions (Suzie et al. 1991), the long-term goal of which is to develop a better, healthier perspective and help the patient bear their circumstances more easily.  Time (minutes) spent providing supportive psychotherapy: 8  (This time is exclusive to the therapy session and separate from the time spent on activities used to meet the criteria for the E/M service (history, exam, medical decision-making).)  Start: 4:56  Stop: 5:04  Functional status: mild impairment  Treatment plan: Medication management and supportive psychotherapy  Prognosis: good  Progress: stable  4w    03/12/2025: fairly stable, even considering reducing pristiq. No changes, close follow up. She is also a . Just passed a class (got an A). Has two classes next semester starting Monday: geography and history. 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 More immediate goal is to get a AD.    02/12/2025: IUP @ 6w2d (d). Stopped buspar, never started propranolol, adderall. Continue hydroxyzine, pristiq. Reduce dyanavel XR (lowest possible dose for greatest possible effect). Consider therapy in the future (social anxiety, she is an introvert). 4w    PLAN:  Safety: No acute safety  concerns  Therapy:  in the future  Risk Assessment: Risk of self-harm acutely is moderate.  Risk factors include anxiety disorder, mood disorder, and recent psychosocial stressors (pandemic). Protective factors include no family history, denies access to guns/weapons, no present SI, no history of suicide attempts or self-harm in the past, minimal AODA, healthcare seeking, future orientation, willingness to engage in care.  Risk of self-harm chronically is also moderate, but could be further elevated in the event of treatment noncompliance and/or AODA.  Meds:  CONTINUE Dyanavel XR 10 mg qday. Risks, benefits, side effects discussed with patient including elevated heart rate, elevated blood pressure, irritability, insomnia, sexual dysfunction, appetite suppressing properties, psychosis, stroke, myocardial infarction, seizure.  Most data on stimulant exposure in pregnancy comes from studies of drug abuse; there are not enough data on use of stimulant medications in pregnancy to allow definitive conclusions about their reproductive safety.  Amphetamine or cocaine abuse is associated with low birth weight, prematurity, and increased maternal and fetal morbidity, likely related to placental vasoconstriction. Available data for amphetamines and methylphenidate suggest no increase in the risk of malformation when used at therapeutic doses, while infants might have slightly lower birth weights. A recent prospective, longitudinal observational study of pregnant women noted an increased risk of gestational hypertension in women maintained on psychostimulants during pregnancy. Vigilant prenatal monitoring of blood pressure is therefore indicated. There is no neurocognitive data available for infants/children exposed to stimulants in utero. After reviewing this data with patient, we mutually agreed to go forward with treatment during pregnancy. Edwin reviewed, UDS ordered, and controlled substance agreement signed.  CONTINUE  pristiq 50 mg qday. Risks, benefits, alternatives discussed with patient including GI upset, nausea vomiting diarrhea, theoretical decrease of seizure threshold predisposing the patient to a slightly higher seizure risk, headaches, sexual dysfunction, serotonin syndrome, bleeding risk, increased suicidality in patients 24 years and younger, switching to adarsh/hypomania.  Also constipation and urinary retention.  SSRIs are the best studied class of antidepressants regarding use in pregnancy. An estimated 10% of all pregnant females are taking an SSRI. Risks, benefits, and potential side effects of SSRIs in pregnancy, including general pregnancy outcomes (birth weight, length of gestation and APGAR scores), major and minor congenital malformations, particularly septal cardiac defects, persistent pulmonary hypertension of the  (PPHN), neurocognitive development and autism, post partum hemorrhage, and  adaptation syndrome were discussed in detail with the patient. These risks are not dose-dependent. Most studies linking SSRIs to adverse birth outcomes are confounded by indication for their use (meaning, behaviors and risk factors associated with the psychiatric illness might influence some of these associations). Large studies that attempt to control for the underlying psychiatric illness generally suggest no increased risk of adverse birth/infant/child outcomes. Notably, there is a high relapse rate in women who stop their antidepressants for pregnancy. After discussion of these risks, as well as risk of untreated psychiatric illness, patient and examiner have mutually agreed to go forward with use of this medication during pregnancy. SNRIs risks are the same as SSRIs, but require further vigilance regarding BPs. Also more recent data shows higher risk of poorer pregnancy outcomes with Cymbalta (postpartum hemorrhage), but the confidence interval is not concerning with the exception of postpartum  hemorrhage, which is increased with any serotonergic agent.  NOT REALLY TAKING hydroxyzine 10 mg tid prn anxiety. Risks, benefits, alternatives discussed with patient including sedation, dizziness, fall risk, GI upset, and risk of increased CNS depression and elevated heart rate if taken with other antihistamines.  Use care when operating vehicle, vessel, or machine. Safe in pregnancy but watch postpartum as it can reduce milk letdown. After discussion of these risks and benefits, the patient voiced understanding and agreed to proceed.  S/P:  buspar (no data regarding safety in pregnancy)  adderall, propranolol (never started either).  Labs: UDS now    Patient screened positive for depression based on a PHQ-9 score of 9 on 2/12/2025. Follow-up recommendations include: Prescribed antidepressant medication treatment and Suicide Risk Assessment performed.           TREATMENT PLAN/GOALS: Continue supportive psychotherapy efforts and medications as indicated. Treatment and medication options discussed during today's visit. Patient acknowledged and verbally consented to continue with current treatment plan and was educated on the importance of compliance with treatment and follow-up appointments.    MEDICATION ISSUES:  MINH reviewed as expected.  Discussed medication options and treatment plan of prescribed medication as well as the risks, benefits, and side effects including potential falls, possible impaired driving and metabolic adversities among others. Patient is agreeable to call the office with any worsening of symptoms or onset of side effects. Patient is agreeable to call 911 or go to the nearest ER should he/she begin having SI/HI. No medication side effects or related complaints today.     MEDS ORDERED DURING VISIT:  New Medications Ordered This Visit   Medications    Amphetamine ER (Dyanavel XR) 10 MG tablet controlled-release     Sig: Take 1 tablet by mouth Every Morning.     Dispense:  30 tablet      Refill:  0     Thank you for the help. Please call with questions: [phone].    desvenlafaxine (PRISTIQ) 50 MG 24 hr tablet     Sig: Take 1 tablet by mouth Daily.     Dispense:  90 tablet     Refill:  3     Refills. Thank you for the help. Please call with questions: [phone].       Return in about 4 weeks (around 5/7/2025) for Video visit.         This document has been electronically signed by Karson Garza MD  April 9, 2025 17:06 EDT    Dictated Utilizing Dragon Dictation: Part of this note may be an electronic transcription/translation of spoken language to printed text using the Dragon Dictation System.

## 2025-04-10 ENCOUNTER — TELEPHONE (OUTPATIENT)
Dept: PSYCHIATRY | Facility: CLINIC | Age: 30
End: 2025-04-10
Payer: COMMERCIAL

## 2025-04-14 NOTE — TELEPHONE ENCOUNTER
Called pt again.  I left a message. Pt has reviewed these results on [facility].  I am sending a message to see if she has any further questions.

## 2025-04-24 ENCOUNTER — ROUTINE PRENATAL (OUTPATIENT)
Dept: OBSTETRICS AND GYNECOLOGY | Age: 30
End: 2025-04-24
Payer: COMMERCIAL

## 2025-04-24 VITALS — SYSTOLIC BLOOD PRESSURE: 139 MMHG | BODY MASS INDEX: 24.45 KG/M2 | WEIGHT: 138 LBS | DIASTOLIC BLOOD PRESSURE: 86 MMHG

## 2025-04-24 DIAGNOSIS — Z34.00 SUPERVISION OF NORMAL FIRST PREGNANCY, ANTEPARTUM: Primary | ICD-10-CM

## 2025-04-24 DIAGNOSIS — F41.9 ANXIETY DISORDER AFFECTING PREGNANCY, ANTEPARTUM: ICD-10-CM

## 2025-04-24 DIAGNOSIS — O26.899 RH NEGATIVE, ANTEPARTUM: ICD-10-CM

## 2025-04-24 DIAGNOSIS — O99.340 ANXIETY DISORDER AFFECTING PREGNANCY, ANTEPARTUM: ICD-10-CM

## 2025-04-24 DIAGNOSIS — Z67.91 RH NEGATIVE, ANTEPARTUM: ICD-10-CM

## 2025-04-24 LAB
AMPHET+METHAMPHET UR QL: POSITIVE
AMPHETAMINES UR QL: NEGATIVE
BARBITURATES UR QL SCN: NEGATIVE
BENZODIAZ UR QL SCN: NEGATIVE
BUPRENORPHINE SERPL-MCNC: NEGATIVE NG/ML
CANNABINOIDS SERPL QL: NEGATIVE
COCAINE UR QL: NEGATIVE
FENTANYL UR-MCNC: NEGATIVE NG/ML
GLUCOSE UR STRIP-MCNC: NEGATIVE MG/DL
METHADONE UR QL SCN: NEGATIVE
OPIATES UR QL: NEGATIVE
OXYCODONE UR QL SCN: NEGATIVE
PCP UR QL SCN: NEGATIVE
PROT UR STRIP-MCNC: NEGATIVE MG/DL
TRICYCLICS UR QL SCN: NEGATIVE

## 2025-04-24 PROCEDURE — 87186 SC STD MICRODIL/AGAR DIL: CPT | Performed by: STUDENT IN AN ORGANIZED HEALTH CARE EDUCATION/TRAINING PROGRAM

## 2025-04-24 PROCEDURE — 80307 DRUG TEST PRSMV CHEM ANLYZR: CPT | Performed by: STUDENT IN AN ORGANIZED HEALTH CARE EDUCATION/TRAINING PROGRAM

## 2025-04-24 PROCEDURE — 87086 URINE CULTURE/COLONY COUNT: CPT | Performed by: STUDENT IN AN ORGANIZED HEALTH CARE EDUCATION/TRAINING PROGRAM

## 2025-04-24 PROCEDURE — 87077 CULTURE AEROBIC IDENTIFY: CPT | Performed by: STUDENT IN AN ORGANIZED HEALTH CARE EDUCATION/TRAINING PROGRAM

## 2025-04-24 NOTE — ASSESSMENT & PLAN NOTE
SHAHNAZ finalized: 10/5/25 per LMP, 12-week US and ACOG    Optional testing NIPS,CF/SMA,AFP:  NIPS ordered    COVID vaccine: Fully vaccinated  Flu vaccine: Vaccinated for 24-25 season  Tdap vaccine:    Rhogam: Bneg, will need rhogam  1hr Glucola:  FU RPR w glucola:  ? Desires Sterilization:    Anatomy US: ordered  FU US:    PROBLEM LIST/PLAN:   Anxiety - managed by Dr. Garza, currently on Dyanavel and desvenlafaxine, R/B reviewed, EPDS 9 at new OB    RH-    Rubella non-immune - plan vaccine postpartum

## 2025-04-25 DIAGNOSIS — O23.40 URINARY TRACT INFECTION IN MOTHER DURING PREGNANCY, ANTEPARTUM: Primary | ICD-10-CM

## 2025-04-25 RX ORDER — GRANULES FOR ORAL 3 G/1
3 POWDER ORAL ONCE
Qty: 3 G | Refills: 0 | Status: SHIPPED | OUTPATIENT
Start: 2025-04-25 | End: 2025-04-25

## 2025-04-26 LAB — BACTERIA SPEC AEROBE CULT: ABNORMAL

## 2025-05-07 ENCOUNTER — TELEPHONE (OUTPATIENT)
Dept: OBSTETRICS AND GYNECOLOGY | Age: 30
End: 2025-05-07
Payer: COMMERCIAL

## 2025-05-07 NOTE — TELEPHONE ENCOUNTER
"Patient sent: \"Hello. I was wondering if it’s normal to have hip/tailbone pain and to have some sharp pains in those areas every so often during pregnancy? Thank you!\"    Left a message for the patient to discuss her concerns.   "

## 2025-05-08 NOTE — TELEPHONE ENCOUNTER
Left a message for the patient trying to discuss her concerns. Information also sent via [facility] per her request.

## 2025-05-13 ENCOUNTER — TELEMEDICINE (OUTPATIENT)
Dept: PSYCHIATRY | Facility: CLINIC | Age: 30
End: 2025-05-13
Payer: COMMERCIAL

## 2025-05-13 DIAGNOSIS — F90.0 ADHD (ATTENTION DEFICIT HYPERACTIVITY DISORDER), INATTENTIVE TYPE: Primary | ICD-10-CM

## 2025-05-13 DIAGNOSIS — F51.05 INSOMNIA DUE TO MENTAL CONDITION: ICD-10-CM

## 2025-05-13 DIAGNOSIS — F41.0 PANIC ATTACKS: ICD-10-CM

## 2025-05-13 DIAGNOSIS — F33.1 MAJOR DEPRESSIVE DISORDER, RECURRENT EPISODE, MODERATE: ICD-10-CM

## 2025-05-13 DIAGNOSIS — F41.1 GENERALIZED ANXIETY DISORDER: ICD-10-CM

## 2025-05-13 RX ORDER — AMPHETAMINE 10 MG/1
1 TABLET, EXTENDED RELEASE ORAL
Qty: 30 TABLET | Refills: 0 | Status: SHIPPED | OUTPATIENT
Start: 2025-05-17

## 2025-05-13 NOTE — TREATMENT PLAN
Anxiety:  3/10 progressing    Goals:  Patient will develop and implement behavioral and cognitive strategies to reduce anxiety and irrational fears, monthly, using Rogerian psychotherapy and CBT where appropriate.  Help patient explore past emotional issues in relation to present anxiety, monthly, until remission of symptoms, using Rogerian psychotherapy and CBT where appropriate.  Help patient develop an awareness of their cognitive and physical responses to anxiety, monthly, until remission of symptoms, using Rogerian psychotherapy and CBT where appropriate.          Depression:  2/10 progressing    Goals:  Patient will demonstrate the ability to initiate new constructive life skills outside of sessions on a consistent basis, monthly, using Rogerian psychotherapy and CBT where appropriate.  Assist patient in setting attainable activities of daily living goals, monthly, using Rogerian psychotherapy and CBT where appropriate.  Provide education about depression, monthly, using Rogerian psychotherapy and CBT where appropriate.  Assist patient in developing healthy coping strategies, monthly, using Rogerian psychotherapy and CBT where appropriate.    Rogerian psychotherapy and CBT will be used to help accomplish the above goals and manage depression and anxiety related to social situations, relationships, introversion       I have discussed and reviewed this treatment plan with the patient.      Reviewed by Karson Garza MD   05/13/2025

## 2025-05-13 NOTE — PROGRESS NOTES
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 and maintaining, restoring, or improving self-esteem, adaptive skills, and ego or psychological functions (Suzie et al. 1991), the long-term goal of which is to develop a better, healthier perspective and help the patient bear their circumstances more easily.  Time (minutes) spent providing supportive psychotherapy: 7  (This time is exclusive to the therapy session and separate from the time spent on activities used to meet the criteria for the E/M service (history, exam, medical decision-making).)  Start: 5:20  Stop: 5:27  Functional status: mild impairment  Treatment plan: Medication management and supportive psychotherapy  Prognosis: good  Progress: stable  6w    04/09/2025: Pt also taking unisom, B6, and wondered about hydroxyzine (taking it more). Basically stable. No changes.    03/12/2025: fairly stable, even considering reducing pristiq. No changes, close follow up. She is also a . Just passed a class (got an A). Has two classes next semester starting Monday: geography and history. 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 More immediate goal is to get a AD.    02/12/2025: IUP @ 6w2d (d). Stopped buspar, never started propranolol, adderall. Continue hydroxyzine, pristiq. Reduce dyanavel XR  (lowest possible dose for greatest possible effect). Consider therapy in the future (social anxiety, she is an introvert). 4w    PLAN:  Safety: No acute safety concerns  Therapy:  in the future  Risk Assessment: Risk of self-harm acutely is moderate.  Risk factors include anxiety disorder, mood disorder, and recent psychosocial stressors (pandemic). Protective factors include no family history, denies access to guns/weapons, no present SI, no history of suicide attempts or self-harm in the past, minimal AODA, healthcare seeking, future orientation, willingness to engage in care.  Risk of self-harm chronically is also moderate, but could be further elevated in the event of treatment noncompliance and/or AODA.  Meds:  CONTINUE Dyanavel XR 10 mg qday. Risks, benefits, side effects discussed with patient including elevated heart rate, elevated blood pressure, irritability, insomnia, sexual dysfunction, appetite suppressing properties, psychosis, stroke, myocardial infarction, seizure.  Most data on stimulant exposure in pregnancy comes from studies of drug abuse; there are not enough data on use of stimulant medications in pregnancy to allow definitive conclusions about their reproductive safety.  Amphetamine or cocaine abuse is associated with low birth weight, prematurity, and increased maternal and fetal morbidity, likely related to placental vasoconstriction. Available data for amphetamines and methylphenidate suggest no increase in the risk of malformation when used at therapeutic doses, while infants might have slightly lower birth weights. A recent prospective, longitudinal observational study of pregnant women noted an increased risk of gestational hypertension in women maintained on psychostimulants during pregnancy. Vigilant prenatal monitoring of blood pressure is therefore indicated. There is no neurocognitive data available for infants/children exposed to stimulants in utero. After reviewing this data with  patient, we mutually agreed to go forward with treatment during pregnancy. Edwin reviewed, UDS ordered, and controlled substance agreement signed.  CONTINUE pristiq 50 mg qday. Risks, benefits, alternatives discussed with patient including GI upset, nausea vomiting diarrhea, theoretical decrease of seizure threshold predisposing the patient to a slightly higher seizure risk, headaches, sexual dysfunction, serotonin syndrome, bleeding risk, increased suicidality in patients 24 years and younger, switching to adarsh/hypomania.  Also constipation and urinary retention.  SSRIs are the best studied class of antidepressants regarding use in pregnancy. An estimated 10% of all pregnant females are taking an SSRI. Risks, benefits, and potential side effects of SSRIs in pregnancy, including general pregnancy outcomes (birth weight, length of gestation and APGAR scores), major and minor congenital malformations, particularly septal cardiac defects, persistent pulmonary hypertension of the  (PPHN), neurocognitive development and autism, post partum hemorrhage, and  adaptation syndrome were discussed in detail with the patient. These risks are not dose-dependent. Most studies linking SSRIs to adverse birth outcomes are confounded by indication for their use (meaning, behaviors and risk factors associated with the psychiatric illness might influence some of these associations). Large studies that attempt to control for the underlying psychiatric illness generally suggest no increased risk of adverse birth/infant/child outcomes. Notably, there is a high relapse rate in women who stop their antidepressants for pregnancy. After discussion of these risks, as well as risk of untreated psychiatric illness, patient and examiner have mutually agreed to go forward with use of this medication during pregnancy. SNRIs risks are the same as SSRIs, but require further vigilance regarding BPs. Also more recent data shows higher  risk of poorer pregnancy outcomes with Cymbalta (postpartum hemorrhage), but the confidence interval is not concerning with the exception of postpartum hemorrhage, which is increased with any serotonergic agent.  NOT REALLY TAKING hydroxyzine 10 mg tid prn anxiety. Risks, benefits, alternatives discussed with patient including sedation, dizziness, fall risk, GI upset, and risk of increased CNS depression and elevated heart rate if taken with other antihistamines.  Use care when operating vehicle, vessel, or machine. Safe in pregnancy but watch postpartum as it can reduce milk letdown. After discussion of these risks and benefits, the patient voiced understanding and agreed to proceed.  S/P:  buspar (no data regarding safety in pregnancy)  adderall, propranolol (never started either).  Labs: UDS now    Patient screened positive for depression based on a PHQ-9 score of 9 on 2/12/2025. Follow-up recommendations include: Prescribed antidepressant medication treatment and Suicide Risk Assessment performed.           TREATMENT PLAN/GOALS: Continue supportive psychotherapy efforts and medications as indicated. Treatment and medication options discussed during today's visit. Patient acknowledged and verbally consented to continue with current treatment plan and was educated on the importance of compliance with treatment and follow-up appointments.    MEDICATION ISSUES:  MINH reviewed as expected.  Discussed medication options and treatment plan of prescribed medication as well as the risks, benefits, and side effects including potential falls, possible impaired driving and metabolic adversities among others. Patient is agreeable to call the office with any worsening of symptoms or onset of side effects. Patient is agreeable to call 911 or go to the nearest ER should he/she begin having SI/HI. No medication side effects or related complaints today.     MEDS ORDERED DURING VISIT:  New Medications Ordered This Visit    Medications    Amphetamine ER (Dyanavel XR) 10 MG tablet controlled-release     Sig: Take 1 tablet by mouth Every Morning.     Dispense:  30 tablet     Refill:  0     Thank you for the help. Please call with questions: [phone].       Return in about 6 weeks (around 6/24/2025) for Video visit.         This document has been electronically signed by Karson Garza MD  May 13, 2025 17:28 EDT    Dictated Utilizing Dragon Dictation: Part of this note may be an electronic transcription/translation of spoken language to printed text using the Dragon Dictation System.

## 2025-05-14 NOTE — PROGRESS NOTES
Routine Prenatal Visit     Subjective  Nicole Meade is a 30 y.o.  at 16w4d here for her routine OB visit.   She is taking her prenatal vitamins.Reports no loss of fluid or vaginal bleeding. Patient doing well.     Pregnancy is complicated by:     Patient Active Problem List   Diagnosis    Supervision of normal first pregnancy, antepartum    Anxiety disorder affecting pregnancy, antepartum    Nausea and vomiting during pregnancy    Rh negative, antepartum    Rubella non-immune status, antepartum         OB History    Para Term  AB Living   1        SAB IAB Ectopic Molar Multiple Live Births              # Outcome Date GA Lbr Lux/2nd Weight Sex Type Anes PTL Lv   1 Current                    ROS:    General ROS: negative for - chills or fatigue  Genito-Urinary ROS: negative for  change in urinary stream, vaginal discharge     Objective  Physical Exam:    Vitals:    25 1423   BP: 139/86       Uterine Size: size equals dates  FHT: 110-160 BPM         Assessment/Plan:   Diagnoses and all orders for this visit:    1. Supervision of normal first pregnancy, antepartum (Primary)  Assessment & Plan:  SHAHNAZ finalized: 10/5/25 per LMP, 12-week US and ACOG    Optional testing NIPS,CF/SMA,AFP:  NIPS ordered    COVID vaccine: Fully vaccinated  Flu vaccine: Vaccinated for 24- season  Tdap vaccine:    Rhogam: Bneg, will need rhogam  1hr Glucola:  FU RPR w glucola:  ? Desires Sterilization:    Anatomy US: ordered  FU US:    PROBLEM LIST/PLAN:   Anxiety - managed by Dr. Garza, currently on Dyanavel and desvenlafaxine, R/B reviewed, EPDS 9 at new OB    RH-    Rubella non-immune - plan vaccine postpartum        Orders:  -     POC Urinalysis Dipstick  -     Urine Culture - Urine, Urine, Clean Catch; Future  -     Urine Drug Screen - Urine, Clean Catch; Future  -     US Ob 14 + Weeks Single or First Gestation; Future  -     Urine Culture - Urine, Urine, Clean Catch  -     Urine Drug Screen - Urine, Clean  Chief complaint:   Chief Complaint   Patient presents with   • Botox   • Migraine       Vitals:  Visit Vitals  /76 (BP Location: LUE - Left upper extremity, Patient Position: Sitting, Cuff Size: Regular)   Pulse 82   Ht 5' 1.5\" (1.562 m)   Wt 63 kg (138 lb 14.4 oz)   BMI 25.82 kg/m²       HISTORY OF PRESENT ILLNESS     HPI  Intractable chronic migraine  Vyepti  Botox  New diagnosis bladder cancer      Patient feels the headaches are coming back  Otherwise she is in control of her migraines  She is very happy with this      The patient presents for evaluation of headaches.    She reports a sensation of an impending headache localized to one side of her head. She has not experienced any episodes of nausea, vomiting, or sensitivity to light or noise. Additionally, she does not report any instances of dizziness. She is currently receiving Vyepti infusions as part of her treatment regimen. Over the past 90 days, she has not experienced any headache episodes.    She had surgery last week for a tumor in her bladder. The results indicated the lowest form of bladder cancer. The battery in her back was changed to control the bladder, but it does not work very well.    SOCIAL HISTORY  Hobbies: Roller skating    MEDICATIONS  CURRENT MEDS:  Vyepti Intravenous     Other significant problems:  Patient Active Problem List    Diagnosis Date Noted   • Bladder tumor 03/07/2025     Priority: Low   • S/P irrigation and  hardware removal from right ankle 1/4/21 02/04/2021     Priority: Low   • Cognitive impairment 12/08/2020     Priority: Low   • Factor 5 Leiden mutation per medical record 12/08/2020     Priority: Low   • Postoperative anemia 12/08/2020     Priority: Low   • S/P ORIF (open reduction internal fixation) Right Ankle fracture 12/4/20, Dr. Borges 12/04/2020     Priority: Low   • Trimalleolar fracture of ankle, closed, right, with routine healing, subsequent encounter 11/20/2020     Priority: Low   • Stroke of right  Catch    2. Rh negative, antepartum    3. Anxiety disorder affecting pregnancy, antepartum          Counseling:   Second trimester precautions  Round Ligament Pain:  The uterus has several ligaments which provide support and keep the uterus in place. As the  uterus grows these ligaments are pulled and stretched which often causes sharp stabbing like pain in the inguinal area.   You may find a pregnancy support band helpful. Changing positions may also help. Yoga is a great way to cope with round ligament and low back pain in pregnancy.    Massage may also help with low back pain   Things to Consider at this Point in your Pregnancy:  Some women experience swelling in their feet during pregnancy. Compression stockings may help  Drink plenty of water and stay active   Make sure you are eating frequent small meals, nuts are a wonderful snack to keep with you            Return in about 4 weeks (around 5/22/2025) for Anatomy US, Routine OB visit.      We have gone over prenatal care to include the timing and content of visits. I informed her how to contact the office and/or on call person in the event of any problems and encouraged her to do so when she feels it is necessary.  We then spent time answering her questions which she indicated were answered to her satisfaction.    Ghislaine Morris DO  4/24/2025 14:43 EDT   basal ganglia 11/5/2020 11/04/2020     Priority: Low   • Recurrent major depressive disorder, in partial remission (CMD) 02/26/2019     Priority: Low   • Status post placement of implantable loop recorder 09/26/2017     Priority: Low     HAS REMOTE FOLLOW UP - Syncope/Palps/VT monitor group     • Gastroparesis 05/24/2016     Priority: Low     Delayed gastric emptying scan of 323 minutes  Zeus Ascencio MD   5/24/2016       • Transfer dysphagia 05/24/2016     Priority: Low     Symptoms highly suggestive of transfer dysphagia likely postoperative in nature following neck as well as thyroid surgery in the past  Zeus Ascencio MD   5/24/2016       • Transient cerebral ischemia, unspecified transient cerebral ischemia type 02/24/2016     Priority: Low   • History of syncope 12/30/2015     Priority: Low   • Fecal incontinence 12/22/2014     Priority: Low   • Chronic daily headache 04/08/2013     Priority: Low   • Seasonal allergies 02/07/2013     Priority: Low     Allergic rhinitis, nasal congestion.      • PFO (patent foramen ovale) (CMD) 02/07/2013     Priority: Low   • Anxiety and depression 02/07/2013     Priority: Low   • Chronic back pain 02/07/2013     Priority: Low   • Fibromyalgia 02/07/2013     Priority: Low   • Hypercholesterolemia 02/07/2013     Priority: Low   • TIA (transient ischemic attack) 02/07/2013     Priority: Low   • Migraines 02/07/2013     Priority: Low   • Lactose intolerance 02/07/2013     Priority: Low   • Hypothyroidism 02/07/2013     Priority: Low   • GERD (gastroesophageal reflux disease) 02/07/2013     Priority: Low   • Glaucoma 02/07/2013     Priority: Low   • Urinary incontinence s/p Inter Stim implantation 02/07/2013     Priority: Low   • Encounter for therapeutic drug monitoring 11/02/2012     Priority: Low   • Anticoagulant long-term use 11/02/2012     Priority: Low       PAST MEDICAL, FAMILY AND SOCIAL HISTORY     Medications:  Current Outpatient Medications   Medication Sig Dispense  Refill   • erythromycin (ILOTYCIN) ophthalmic ointment PLACE 0.5 INCH INTO BOTH EYES EVERYDAY AT BEDTIME 3.5 g 3   • melatonin 3 MG TAKE 1 TABLET BY MOUTH ONCE DAILY AT BEDTIME 31 tablet 10   • gabapentin (NEURONTIN) 300 MG capsule TAKE 1 CAPSULE BY MOUTH THREE TIMES DAILY 92 capsule 1   • QUEtiapine (SEROquel) 25 MG tablet TAKE 1 TABLET BY MOUTH ONCE DAILY AT BEDTIME 31 tablet 0   • albuterol 108 (90 Base) MCG/ACT inhaler Inhale 2 puffs into the lungs every 4 hours as needed for Shortness of Breath or Wheezing. Indications: SOB or wheezing 1 each 1   • Myrbetriq 50 MG 24 hr tablet TAKE 1 TABLET BY MOUTH ONCE A DAY IN THE EVENING FOR URINARY PROBLEMS (BRAND- NOT ON CYCLE) 30 tablet 10   • rivaroxaban (Xarelto) 20 MG Tab TAKE 1 TABLET BY MOUTH ONCE A DAY IN THE EVENING (BRAND- NOT ON CYCLE) 33 tablet 11   • amLODIPine (NORVASC) 5 MG tablet TAKE 1 TABLET BY MOUTH ONCE A DAY IN THE EVENING 31 tablet 4   • atorvastatin (LIPITOR) 40 MG tablet TAKE 1 TABLET EVERYDAY AT BEDTIME 31 tablet 4   • imipramine (TOFRANIL) 25 MG tablet TAKE 2 TABLETS BY MOUTH AT BEDTIME 62 tablet 4   • levocetirizine (XYZAL) 5 MG tablet TAKE 1 TABLET BY MOUTH AT 1400 DAILY 31 tablet 4   • levothyroxine 100 MCG tablet TAKE 1 TABLET BY MOUTH ONCE A DAY IN THE EVENING 31 tablet 4   • montelukast (SINGULAIR) 10 MG tablet TAKE 1 TABLET BY MOUTH ONCE DAILY AT BEDTIME 31 tablet 4   • venlafaxine XR (EFFEXOR XR) 150 MG 24 hr capsule TAKE 2 CAPSULES BY MOUTH ONCE A DAY IN THE EVENING 60 capsule 10   • pantoprazole (PROTONIX) 40 MG tablet TAKE 1 TABLET BY MOUTH ONCE DAILY 31 tablet 4   • potassium CHLORIDE (KLOR-CON M) 20 MEQ jorge ER tablet TAKE 1 TABLET BY MOUTH ONCE A DAY IN THE EVENING 31 tablet 4   • Calcium Carbonate-Vitamin D (Calcium 600 +D High Potency) 600-10 MG-MCG Tab Take 1 tablet by mouth daily. In the evening 31 tablet 5   • Acetaminophen Extra Strength 500 MG Tab TAKE 2 TABLETS BY MOUTH ONCE DAILY AT BEDTIME FOR PAIN 62 tablet 5   • docusate  sodium-sennosides (Stool Softener/Laxative) 50-8.6 MG per tablet TAKE 2 TABLETS BY MOUTH ONCE DAILY AT BEDTIME FOR CONSTIPATION (HOLD FOR LOOSE STOOLS) 62 tablet 5   • cycloSPORINE (RESTASIS) 0.05 % ophthalmic emulsion Place 1 drop into both eyes in the morning and 1 drop in the evening. 60 each 6   • omeprazole (PrilOSEC) 20 MG capsule [None received]       No current facility-administered medications for this visit.       Allergies:  ALLERGIES:   Allergen Reactions   • Xylocaine [Lidocaine Hcl (Local Anesth.)] ANAPHYLAXIS   • Allergy Other (See Comments)     Preservatives    Diarrhea   Seasonal Allergies     Allergic rhinitis  Tayo Mold    • Codeine Nausea & Vomiting   • Hay Fever & [Chlorpheniramine-Ppa Cr] Other (See Comments)   • Lactose Intolerance   (Food Or Med) Other (See Comments)   • Lactose Intolerance [Milk Intolerance   (Food)] DIARRHEA   • Latex Other (See Comments)   • Latex   (Environmental) HIVES, RASH and PRURITUS   • Morphine VOMITING     Vomiting syndrome, epidemic, hallucinations.    • Oxybutynin Chloride Other (See Comments)   • Unknown Other (See Comments)       Past Medical  History/Surgeries:  Past Medical History:   Diagnosis Date   • Anxiety    • Bronchitis    • Depressive disorder    • DVT, lower extremity  (CMD)     left leg   • Essential hypertension, benign    • Factor 5 Leiden mutation, heterozygous  (CMD)    • Headache(784.0)    • Heart abnormality (CMD)     hole in heart   • Hypercholesterolemia    • Hypothyroidism    • Incontinence    • Microscopic hematuria     Negative workup   • Neuropathy    • Osteoporosis, unspecified 07/31/2009   • Pneumonia    • PONV (postoperative nausea and vomiting)    • Stroke  (CMD)     left side weakness    • Vertigo        Past Surgical History:   Procedure Laterality Date   • Ankle fracture surgery Right 12/04/2020   • Appendectomy     • Breast cyst aspiration  09/21/2005   • Cataract extraction w/  intraocular lens implant  12/22/2005   • Cervical  fusion     • Colonoscopy  07/20/2018   • Colonoscopy w biopsy  06/19/2009   • Colonoscopy w/ polypectomy  1/23/15    Repeat in 3 years per Dr. Ascencio for hx: multiple polyps.    • Cystourethroscopy  08/04/2004   • Esophagogastroduodenoscopy  4/2016   • Eye surgery     • Flexible sigmoidoscopy diagnostic include specimens  06/18/2009   • Glaucoma surgery     • Hysterectomy  11/12/2008    no cancer, prolonged periods   • Implant replace peripheral sacral gastric neurostim  N/A 8/6/2019    INSERTION, NEUROSTIMULATOR, BLADDER performed by Raplh Lyons MD at [facility]   • Mammo screening bilateral  10/01/2008   • Thoracic outlet surgery     • Thyroidectomy  09/06/2006    complete       Family History:  Family History   Problem Relation Age of Onset   • Dementia/Alzheimers Mother    • Depression Mother    • Anxiety disorder Mother    • Other Mother         Gallstones   • Hypertension Mother    • Stroke Mother    • Osteoarthritis Mother    • Diabetes Mother    • Heart disease Mother         MI 70's   • High blood pressure Mother    • Hyperlipidemia Mother    • Anxiety disorder Father    • Bleeding Disorder Father         Factor 5 Leiden mutation, heterozygous [289.81CW]   • Osteoarthritis Father    • Heart disease Father         MI 70's   • Hyperlipidemia Father    • Diabetes Father    • Cancer Sister         breast cancer   • Bleeding Disorder Sister    • Other Sister         Gallstones   • Osteoarthritis Sister    • Depression Sister    • Bleeding Disorder Sister    • Cancer Sister         melanoma   • Diabetes Brother    • Depression Brother    • Bleeding Disorder Brother    • Heart disease Brother         bypass and stents   • Heart disease Brother         aneurysm   • Depression Daughter        Social History:  Social History     Tobacco Use   • Smoking status: Every Day     Current packs/day: 0.25     Average packs/day: 0.3 packs/day for 32.0 years (8.0 ttl pk-yrs)     Types: Cigarettes   • Smokeless  tobacco: Never   • Tobacco comments:     3-28-23  3 per day   Substance Use Topics   • Alcohol use: No     Alcohol/week: 0.0 standard drinks of alcohol     Comment: 2 times a year        REVIEW OF SYSTEMS     Review of Systems    PHYSICAL EXAM     Physical Exam  Mental Status Examination    Orientation: Oriented to person, place, and time.    Language: Speech intact.    Cranial Nerve Examination    CN III IV VI: Pupils equal, round, reactive to light. Extraocular movements are intact.    CN VII: Facial muscles are symmetric.    Motor Examination    Strength: Motor is 5 out of 5 in arms and legs, proximal and distally.    Reflexes    Deep Tendon Reflexes: Deep tendon reflexes are 2+ at the quads bilaterally, 1+ at the ankle jerks.    Gait and Station    Gait: Gait is unremarkable.    Psychiatric: Mental status pleasant, appropriate. Mood and affect are good.     ASSESSMENT/PLAN     1. Headaches.  She reports no headache days in the last 90 days. There is no nausea, vomiting, light or noise sensitivity, or dizziness. She continues to receive Vyepti infusions.     Botox was administered in the following fashion: Procerus 5 units,  5 units bilaterally, frontal 2.5 units with 4 injections in the forehead totaling 12.5 units bilaterally, temporo-occipital cervical 25 units bilaterally, trapezius.  150 units of Botox drawn and used 0 wasted. The patient tolerated the procedure with no complications.    PROCEDURE  Botox was administered in the following fashion: Procerus 5 units,  5 units bilaterally, Frontal 2.5 units in 4 injections in the forehead, 12.5 units bilaterally, temporo-occipital cervical 25 units bilaterally, trapezius. The patient tolerated the procedure well with no complications.       Solomon was seen today for a repeat Botox injection for intractable chronic daily migraine.    Neurological exam:  Patient's vital signs are recorded by the nurse.      Procedure:  The risks and  benefits of the procedure were explained to the patient.  An informed consent was obtained and in the chart.  The skin overlying the area of the injection was cleansed with alcohol and 150 units of Botox was diluted in  3 cc of preservative-free normal saline and injected as follows without EMG guidance:    #2 bladder cancer

## 2025-05-20 DIAGNOSIS — O21.9 NAUSEA AND VOMITING DURING PREGNANCY: ICD-10-CM

## 2025-05-20 RX ORDER — DOXYLAMINE SUCCINATE 25 MG/1
TABLET ORAL
Qty: 30 TABLET | Refills: 1 | Status: SHIPPED | OUTPATIENT
Start: 2025-05-20

## 2025-05-22 ENCOUNTER — ROUTINE PRENATAL (OUTPATIENT)
Dept: OBSTETRICS AND GYNECOLOGY | Age: 30
End: 2025-05-22
Payer: COMMERCIAL

## 2025-05-22 VITALS — BODY MASS INDEX: 24.45 KG/M2 | WEIGHT: 138 LBS | SYSTOLIC BLOOD PRESSURE: 107 MMHG | DIASTOLIC BLOOD PRESSURE: 65 MMHG

## 2025-05-22 DIAGNOSIS — Z34.00 SUPERVISION OF NORMAL FIRST PREGNANCY, ANTEPARTUM: Primary | ICD-10-CM

## 2025-05-22 DIAGNOSIS — O99.891 BACK PAIN AFFECTING PREGNANCY: ICD-10-CM

## 2025-05-22 DIAGNOSIS — Z67.91 RH NEGATIVE, ANTEPARTUM: ICD-10-CM

## 2025-05-22 DIAGNOSIS — O26.899 RH NEGATIVE, ANTEPARTUM: ICD-10-CM

## 2025-05-22 DIAGNOSIS — M54.9 BACK PAIN AFFECTING PREGNANCY: ICD-10-CM

## 2025-05-22 DIAGNOSIS — R30.0 DYSURIA: ICD-10-CM

## 2025-05-22 LAB
GLUCOSE UR STRIP-MCNC: NEGATIVE MG/DL
PROT UR STRIP-MCNC: NEGATIVE MG/DL

## 2025-05-22 PROCEDURE — 87077 CULTURE AEROBIC IDENTIFY: CPT | Performed by: STUDENT IN AN ORGANIZED HEALTH CARE EDUCATION/TRAINING PROGRAM

## 2025-05-22 PROCEDURE — 87186 SC STD MICRODIL/AGAR DIL: CPT | Performed by: STUDENT IN AN ORGANIZED HEALTH CARE EDUCATION/TRAINING PROGRAM

## 2025-05-22 PROCEDURE — 87086 URINE CULTURE/COLONY COUNT: CPT | Performed by: STUDENT IN AN ORGANIZED HEALTH CARE EDUCATION/TRAINING PROGRAM

## 2025-05-22 NOTE — PROGRESS NOTES
Routine Prenatal Visit     Subjective  Nicole Meade is a 30 y.o.  at 20w4d here for her routine OB visit.   She is taking her prenatal vitamins.Reports no loss of fluid or vaginal bleeding. Patient doing well.  Having some back pain. Concerned for kidney infection. Denies fever/chills.     Pregnancy complicated by:     Patient Active Problem List   Diagnosis    Supervision of normal first pregnancy, antepartum    Anxiety disorder affecting pregnancy, antepartum    Nausea and vomiting during pregnancy    Rh negative, antepartum    Rubella non-immune status, antepartum         OB History    Para Term  AB Living   1        SAB IAB Ectopic Molar Multiple Live Births              # Outcome Date GA Lbr Lux/2nd Weight Sex Type Anes PTL Lv   1 Current                    ROS:  General ROS: negative for - chills or fatigue  Genito-Urinary ROS: negative for  change in urinary stream, vaginal discharge     Objective  Physical Exam:   Vitals:    25 1202   BP: 107/65       Uterine Size: not examined, US today   FHT: 110-160 BPM         Assessment/Plan:   Diagnoses and all orders for this visit:    1. Supervision of normal first pregnancy, antepartum (Primary)  Assessment & Plan:  SHAHNAZ finalized: 10/5/25 per LMP, 12-week US and ACOG    Optional testing NIPS,CF/SMA,AFP:  NIPS ordered    COVID vaccine: Fully vaccinated  Flu vaccine: Vaccinated for 24-25 season  Tdap vaccine:    Rhogam: Bneg, will need rhogam  1hr Glucola:  FU RPR w glucola:  ? Desires Sterilization:    Anatomy US:  EFW 59%, AC 67%, breech, anterior grade 1 placenta, 3 VC, normal anatomy, suboptimal profile/palate  FU US:    PROBLEM LIST/PLAN:   Anxiety - managed by Dr. Garza, currently on Dyanavel and desvenlafaxine, R/B reviewed, EPDS 9 at new OB    RH-    Rubella non-immune - plan vaccine postpartum      UDS +amphetamine due to patient taking (Dyanavel XR) R/B discussed    Orders:  -     POC Urinalysis Dipstick  -     Urine  Culture - Urine, Urine, Clean Catch; Future  -     Urine Culture - Urine, Urine, Clean Catch  -     US Ob 14 + Weeks Single or First Gestation; Future    2. Back pain affecting pregnancy  -     Urine Culture - Urine, Urine, Clean Catch; Future  -     Urine Culture - Urine, Urine, Clean Catch    3. Dysuria  -     Urine Culture - Urine, Urine, Clean Catch; Future  -     Urine Culture - Urine, Urine, Clean Catch    4. Rh negative, antepartum            Counseling:   OB precautions, leaking, VB, maria e doss vs PTL/Labor  FKC  Round Ligament Pain:  The uterus has several ligaments which provide support and keep the uterus in place. As the  uterus grows these ligaments are pulled and stretched which often causes sharp stabbing like pain in the inguinal area.   You may find a pregnancy support band helpful. Changing positions may also help. Yoga is a great way to cope with round ligament and low back pain in pregnancy.    Massage may also help with low back pain   Things to Consider at this Point in your Pregnancy:  Some women experience swelling in their feet during pregnancy. Compression stockings may help  Drink plenty of water and stay active   Make sure you are eating frequent small meals, nuts are a wonderful snack to keep with you            Return in about 4 weeks (around 6/19/2025) for 1hr gtt, Growth US, Routine OB visit.      We have gone over prenatal care to include the timing and content of visits. I informed her how to contact the office and/or on call person in the event of any problems and encouraged her to do so when she feels it is necessary.  We then spent time answering her questions which she indicated were answered to her satisfaction.    Ghislaine Morris DO  5/22/2025 13:28 EDT

## 2025-05-22 NOTE — ASSESSMENT & PLAN NOTE
SHAHNAZ finalized: 10/5/25 per LMP, 12-week US and ACOG    Optional testing NIPS,CF/SMA,AFP:  NIPS ordered    COVID vaccine: Fully vaccinated  Flu vaccine: Vaccinated for 24-25 season  Tdap vaccine:    Rhogam: Bneg, will need rhogam  1hr Glucola:  FU RPR w glucola:  ? Desires Sterilization:    Anatomy US: 5/22 EFW 59%, AC 67%, breech, anterior grade 1 placenta, 3 VC, normal anatomy, suboptimal profile/palate  FU US:    PROBLEM LIST/PLAN:   Anxiety - managed by Dr. Garza, currently on Dyanavel and desvenlafaxine, R/B reviewed, EPDS 9 at new OB    RH-    Rubella non-immune - plan vaccine postpartum      UDS +amphetamine due to patient taking (Dyanavel XR) R/B discussed

## 2025-05-23 RX ORDER — NITROFURANTOIN 25; 75 MG/1; MG/1
100 CAPSULE ORAL 2 TIMES DAILY
Qty: 10 CAPSULE | Refills: 0 | Status: SHIPPED | OUTPATIENT
Start: 2025-05-23

## 2025-05-24 LAB — BACTERIA SPEC AEROBE CULT: ABNORMAL

## 2025-06-23 DIAGNOSIS — F90.0 ADHD (ATTENTION DEFICIT HYPERACTIVITY DISORDER), INATTENTIVE TYPE: ICD-10-CM

## 2025-06-23 RX ORDER — AMPHETAMINE 10 MG/1
1 TABLET, EXTENDED RELEASE ORAL
Qty: 30 TABLET | Refills: 0 | Status: SHIPPED | OUTPATIENT
Start: 2025-06-23

## 2025-06-23 NOTE — TELEPHONE ENCOUNTER
Appointment with Karson Garza MD (06/27/2025)     Urine Drug Screen - Urine, Clean Catch (04/24/2025 14:31)     CONTROLLED SUBSTANCE AGREEMENT - SCAN (02/12/2025)     Patient needs a refill.  Order pended

## 2025-06-23 NOTE — PROGRESS NOTES
Routine Prenatal Visit     Subjective  Nicole Meade is a 30 y.o.  at 25w2d here for her routine OB visit.   She is taking her prenatal vitamins.Reports no loss of fluid or vaginal bleeding. Patient doing well.     Pregnancy is complicated by:     Patient Active Problem List   Diagnosis    Supervision of normal first pregnancy, antepartum    Anxiety disorder affecting pregnancy, antepartum    Nausea and vomiting during pregnancy    Rh negative, antepartum    Rubella non-immune status, antepartum         OB History    Para Term  AB Living   1        SAB IAB Ectopic Molar Multiple Live Births              # Outcome Date GA Lbr Lux/2nd Weight Sex Type Anes PTL Lv   1 Current                    ROS:    General ROS: negative for - chills or fatigue  Genito-Urinary ROS: negative for  change in urinary stream, vaginal discharge     Objective  Physical Exam:    Vitals:    25 1430   BP: 122/74       Uterine Size: not examined, US today  FHT: 110-160 BPM         Assessment/Plan:   Diagnoses and all orders for this visit:    1. Supervision of normal first pregnancy, antepartum (Primary)  Assessment & Plan:  SHAHNAZ finalized: 10/5/25 per LMP, 12-week US and ACOG    Optional testing NIPS,CF/SMA,AFP:  NIPS ordered    COVID vaccine: Fully vaccinated  Flu vaccine: Vaccinated for - season  Tdap vaccine:    Rhogam: Bneg, will need rhogam  1hr Glucola:  FU RPR w glucola:  ? Desires Sterilization:    Anatomy US:  EFW 59%, AC 67%, breech, anterior grade 1 placenta, 3 VC, normal anatomy, suboptimal profile/palate  FU US:  EFW 68%, AC 57%, cephalic, anterior grade 1 placenta, follow-up anatomy wnl    PROBLEM LIST/PLAN:   Anxiety - managed by Dr. Garza, currently on Dyanavel and desvenlafaxine, R/B reviewed, EPDS 9 at new OB    RH-    Rubella non-immune - plan vaccine postpartum      UDS +amphetamine due to patient taking (Dyanavel XR) R/B discussed    Orders:  -     POC Urinalysis Dipstick  -      Gestational Diabetes Screen 1 Hour; Future  -     CBC (No Diff); Future  -     RPR, Rfx Qn RPR / Confirm TP  -     Gestational Diabetes Screen 1 Hour  -     CBC (No Diff)  -     Urine Culture - Urine, Urine, Clean Catch; Future    2. Supervision of other normal pregnancy, antepartum    3. Rh negative, antepartum    4. Anxiety disorder affecting pregnancy, antepartum          Counseling:   OB precautions, leaking, VB, maria e doss vs PTL/Labor  FKC  Round Ligament Pain:  The uterus has several ligaments which provide support and keep the uterus in place. As the  uterus grows these ligaments are pulled and stretched which often causes sharp stabbing like pain in the inguinal area.   You may find a pregnancy support band helpful. Changing positions may also help. Yoga is a great way to cope with round ligament and low back pain in pregnancy.    Massage may also help with low back pain   Things to Consider at this Point in your Pregnancy:  Some women experience swelling in their feet during pregnancy. Compression stockings may help  Drink plenty of water and stay active   Make sure you are eating frequent small meals, nuts are a wonderful snack to keep with you            Return in about 3 weeks (around 7/15/2025) for Routine OB visit, rhogam.      We have gone over prenatal care to include the timing and content of visits. I informed her how to contact the office and/or on call person in the event of any problems and encouraged her to do so when she feels it is necessary.  We then spent time answering her questions which she indicated were answered to her satisfaction.    Ghislaine Morris,   6/24/2025 15:04 EDT

## 2025-06-24 ENCOUNTER — ROUTINE PRENATAL (OUTPATIENT)
Dept: OBSTETRICS AND GYNECOLOGY | Age: 30
End: 2025-06-24
Payer: COMMERCIAL

## 2025-06-24 ENCOUNTER — PATIENT OUTREACH (OUTPATIENT)
Dept: LABOR AND DELIVERY | Facility: HOSPITAL | Age: 30
End: 2025-06-24
Payer: COMMERCIAL

## 2025-06-24 VITALS — WEIGHT: 142 LBS | SYSTOLIC BLOOD PRESSURE: 122 MMHG | BODY MASS INDEX: 25.15 KG/M2 | DIASTOLIC BLOOD PRESSURE: 74 MMHG

## 2025-06-24 DIAGNOSIS — O99.340 ANXIETY DISORDER AFFECTING PREGNANCY, ANTEPARTUM: ICD-10-CM

## 2025-06-24 DIAGNOSIS — Z67.91 RH NEGATIVE, ANTEPARTUM: ICD-10-CM

## 2025-06-24 DIAGNOSIS — Z34.80 SUPERVISION OF OTHER NORMAL PREGNANCY, ANTEPARTUM: ICD-10-CM

## 2025-06-24 DIAGNOSIS — F41.9 ANXIETY DISORDER AFFECTING PREGNANCY, ANTEPARTUM: ICD-10-CM

## 2025-06-24 DIAGNOSIS — O26.899 RH NEGATIVE, ANTEPARTUM: ICD-10-CM

## 2025-06-24 DIAGNOSIS — Z34.00 SUPERVISION OF NORMAL FIRST PREGNANCY, ANTEPARTUM: Primary | ICD-10-CM

## 2025-06-24 LAB
DEPRECATED RDW RBC AUTO: 39.2 FL (ref 37–54)
ERYTHROCYTE [DISTWIDTH] IN BLOOD BY AUTOMATED COUNT: 11.7 % (ref 12.3–15.4)
GLUCOSE 1H P GLC SERPL-MCNC: 177 MG/DL (ref 65–139)
GLUCOSE UR STRIP-MCNC: NEGATIVE MG/DL
HCT VFR BLD AUTO: 31.9 % (ref 34–46.6)
HGB BLD-MCNC: 10.7 G/DL (ref 12–15.9)
MCH RBC QN AUTO: 31.1 PG (ref 26.6–33)
MCHC RBC AUTO-ENTMCNC: 33.5 G/DL (ref 31.5–35.7)
MCV RBC AUTO: 92.7 FL (ref 79–97)
PLATELET # BLD AUTO: 350 10*3/MM3 (ref 140–450)
PMV BLD AUTO: 10.2 FL (ref 6–12)
PROT UR STRIP-MCNC: NEGATIVE MG/DL
RBC # BLD AUTO: 3.44 10*6/MM3 (ref 3.77–5.28)
WBC NRBC COR # BLD AUTO: 13.02 10*3/MM3 (ref 3.4–10.8)

## 2025-06-24 PROCEDURE — 87086 URINE CULTURE/COLONY COUNT: CPT | Performed by: STUDENT IN AN ORGANIZED HEALTH CARE EDUCATION/TRAINING PROGRAM

## 2025-06-24 PROCEDURE — 85027 COMPLETE CBC AUTOMATED: CPT | Performed by: STUDENT IN AN ORGANIZED HEALTH CARE EDUCATION/TRAINING PROGRAM

## 2025-06-24 PROCEDURE — 82950 GLUCOSE TEST: CPT | Performed by: STUDENT IN AN ORGANIZED HEALTH CARE EDUCATION/TRAINING PROGRAM

## 2025-06-24 PROCEDURE — 87077 CULTURE AEROBIC IDENTIFY: CPT | Performed by: STUDENT IN AN ORGANIZED HEALTH CARE EDUCATION/TRAINING PROGRAM

## 2025-06-24 PROCEDURE — 87186 SC STD MICRODIL/AGAR DIL: CPT | Performed by: STUDENT IN AN ORGANIZED HEALTH CARE EDUCATION/TRAINING PROGRAM

## 2025-06-24 PROCEDURE — 86592 SYPHILIS TEST NON-TREP QUAL: CPT | Performed by: STUDENT IN AN ORGANIZED HEALTH CARE EDUCATION/TRAINING PROGRAM

## 2025-06-24 RX ORDER — GRANULES FOR ORAL 3 G/1
POWDER ORAL
COMMUNITY
Start: 2025-04-25

## 2025-06-24 NOTE — ASSESSMENT & PLAN NOTE
SHAHNAZ finalized: 10/5/25 per LMP, 12-week US and ACOG    Optional testing NIPS,CF/SMA,AFP:  NIPS ordered    COVID vaccine: Fully vaccinated  Flu vaccine: Vaccinated for 24-25 season  Tdap vaccine:    Rhogam: Bneg, will need rhogam  1hr Glucola:  FU RPR w glucola:  ? Desires Sterilization:    Anatomy US: 5/22 EFW 59%, AC 67%, breech, anterior grade 1 placenta, 3 VC, normal anatomy, suboptimal profile/palate  FU US: 6/22 EFW 68%, AC 57%, cephalic, anterior grade 1 placenta, follow-up anatomy wnl    PROBLEM LIST/PLAN:   Anxiety - managed by Dr. Garza, currently on Dyanavel and desvenlafaxine, R/B reviewed, EPDS 9 at [facility]    RH-    Rubella non-immune - plan vaccine postpartum      UDS +amphetamine due to patient taking (Dyanavel XR) R/B discussed

## 2025-06-24 NOTE — OUTREACH NOTE
[facility]  Enrollment    Current Estimated Gestational Age: 25w2d    Questions/Answers      Flowsheet Row Responses   Would like to participate? Yes   Date of Intake Visit 06/24/25        [facility]  Intake    Current Estimated Gestational Age: 25w2d    Intake Assessment      Flowsheet Row Responses   Best Method for Contacting Cell   Currently Employed Yes   Able to keep appointments as scheduled Yes   Gender(s) and Name(s) Boy - Milo   Resources Presently Utilizing: None   Maternal Warning Signs Provided   Other: Provided   Other Education WIC Benefits, Insurance benefits/Incentives, HANDS            Learning Assessment      Flowsheet Row Responses   Relationship Patient, Family   Does the learner have any barriers to learning? No Barriers   What is the preferred language of the learner for medical teaching? English   Is an  required? No            Pediatrician:   LONNIE Hernandez  Feeding Plan:     Encouraged enrollment in WIC.  Dicussed how to get to labor and delivery and s/s to go for.     No current concerns with food, housing or transportation.  Encouraged to reach out for any questions, needs or concerns.    Tobacco, Alcohol, and Drug History     reports that she has never smoked. She has never been exposed to tobacco smoke. She has never used smokeless tobacco.   reports no history of alcohol use.   reports no history of drug use.    Lesly Cardenas RN  Maternity Nurse Navigator    6/24/2025, 14:49 EDT

## 2025-06-25 ENCOUNTER — RESULTS FOLLOW-UP (OUTPATIENT)
Dept: OBSTETRICS AND GYNECOLOGY | Age: 30
End: 2025-06-25
Payer: COMMERCIAL

## 2025-06-25 PROBLEM — O99.019 MATERNAL ANEMIA IN PREGNANCY, ANTEPARTUM: Status: ACTIVE | Noted: 2025-06-25

## 2025-06-25 RX ORDER — FERROUS SULFATE 325(65) MG
325 TABLET ORAL EVERY OTHER DAY
Qty: 15 TABLET | Refills: 2 | Status: SHIPPED | OUTPATIENT
Start: 2025-06-25

## 2025-06-26 LAB
BACTERIA SPEC AEROBE CULT: ABNORMAL
OBSERVATION IMP: NORMAL
RPR SER QL: NON REACTIVE

## 2025-06-27 ENCOUNTER — TELEMEDICINE (OUTPATIENT)
Dept: PSYCHIATRY | Facility: CLINIC | Age: 30
End: 2025-06-27
Payer: COMMERCIAL

## 2025-06-27 DIAGNOSIS — F51.05 INSOMNIA DUE TO MENTAL CONDITION: ICD-10-CM

## 2025-06-27 DIAGNOSIS — F33.1 MAJOR DEPRESSIVE DISORDER, RECURRENT EPISODE, MODERATE: ICD-10-CM

## 2025-06-27 DIAGNOSIS — F41.0 PANIC ATTACKS: ICD-10-CM

## 2025-06-27 DIAGNOSIS — F90.0 ADHD (ATTENTION DEFICIT HYPERACTIVITY DISORDER), INATTENTIVE TYPE: Primary | ICD-10-CM

## 2025-06-27 DIAGNOSIS — F41.1 GENERALIZED ANXIETY DISORDER: ICD-10-CM

## 2025-06-27 NOTE — PROGRESS NOTES
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                6 -    71                               4               10 -   750                               5              465 -  7938                               6              853 - 58475                               7             [id number] [id number]                               8            [id number] [id number]                               9            [id number] [phone]                              10            [id number] [phone]                              12            [id number] [id number]                              14            [id number] - [phone]                              15            81960 - 50479                              16             2669 - [phone]                              17             5214 - 16449                              18             [phone] - [phone]  Results                            confirmed on  dilution.  Roche ECLIA methodology    HCG Qualitative 02/13/2025 Positive (A)  Negative Final    THC, Screen, Urine 02/13/2025 Negative  Negative Final    Phencyclidine (PCP), Urine 02/13/2025 Negative  Negative Final    Cocaine Screen, Urine 02/13/2025 Negative  Negative Final    Methamphetamine, Ur 02/13/2025 Negative  Negative Final    Opiate Screen 02/13/2025 Negative  Negative Final    Amphetamine Screen,  Urine 02/13/2025 Positive (A)  Negative Final    Benzodiazepine Screen, Urine 02/13/2025 Negative  Negative Final    Tricyclic Antidepressants Screen 02/13/2025 Negative  Negative Final    Methadone Screen, Urine 02/13/2025 Negative  Negative Final    Barbiturates Screen, Urine 02/13/2025 Negative  Negative Final    Oxycodone Screen, Urine 02/13/2025 Negative  Negative Final    Buprenorphine, Screen, Urine 02/13/2025 Negative  Negative Final    Fentanyl, Urine 02/13/2025 Negative  Negative Final   Office Visit on 02/13/2025   Component Date Value Ref Range Status    HCG, Urine, QL 02/13/2025 Positive (A)  Negative Final    Lot Number 02/13/2025 [id number]   Final    Internal Positive Control 02/13/2025 Passed  Positive, Passed Final    Internal Negative Control 02/13/2025 Passed  Negative, Passed Final    Expiration Date 02/13/2025 4/8/25   Final    SARS Antigen 02/13/2025 Not Detected  Not Detected, Presumptive Negative Final    Influenza A Antigen SHELDON 02/13/2025 Not Detected  Not Detected Final    Influenza B Antigen SHELDON 02/13/2025 Not Detected  Not Detected Final    Internal Control 02/13/2025 Passed  Passed Final    Lot Number 02/13/2025 [id number]   Final    Expiration Date 02/13/2025 11/14/25   Final       EKG Results:  No orders to display       Imaging Results:  No Images in the past 120 days found..      Assessment & Plan   Diagnoses and all orders for this visit:    1. ADHD (attention deficit hyperactivity disorder), inattentive type (Primary)    2. Generalized anxiety disorder    3. Major depressive disorder, recurrent episode, moderate    4. Insomnia due to mental condition    5. Panic attacks        Visit Diagnoses:    ICD-10-CM ICD-9-CM   1. ADHD (attention deficit hyperactivity disorder), inattentive type  F90.0 314.00   2. Generalized anxiety disorder  F41.1 300.02   3. Major depressive disorder, recurrent episode, moderate  F33.1 296.32   4. Insomnia due to mental condition  F51.05 300.9     327.02   5.  Panic attacks  F41.0 300.01     06/27/2025: Stable, well, no med changes. Very low dose of stimulant, so very unlikely to cause any withdrawal for baby after delivery. 6w    05/13/2025: Stable, well, no med changes.     04/09/2025: Pt also taking unisom, B6, and wondered about hydroxyzine (taking it more). Basically stable. No changes.    03/12/2025: fairly stable, even considering reducing pristiq. No changes, close follow up. She is also a . Just passed a class (got an A). Has two classes next semester starting Monday: geography and history. 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 More immediate goal is to get a AD.    02/12/2025: IUP @ 6w2d (d). Stopped buspar, never started propranolol, adderall. Continue hydroxyzine, pristiq. Reduce dyanavel XR (lowest possible dose for greatest possible effect). Consider therapy in the future (social anxiety, she is an introvert). 4w    PLAN:  Safety: No acute safety concerns  Therapy:  in the future  Risk Assessment: Risk of self-harm acutely is moderate.  Risk factors include anxiety disorder, mood disorder, and recent psychosocial stressors (pandemic). Protective factors include no family history, denies access to guns/weapons, no present SI, no history of suicide attempts or self-harm in the past, minimal AODA, healthcare seeking, future orientation, willingness to engage in care.  Risk of self-harm chronically is also moderate, but could be further elevated in the event of treatment noncompliance and/or AODA.  Meds:  CONTINUE Dyanavel XR 10 mg qday. Risks, benefits, side effects discussed with patient including elevated heart rate, elevated blood pressure, irritability, insomnia, sexual dysfunction, appetite suppressing properties, psychosis, stroke, myocardial infarction, seizure.  Most data on stimulant exposure in pregnancy comes from studies of drug abuse; there are not enough data on use of stimulant medications in pregnancy to allow definitive conclusions  about their reproductive safety.  Amphetamine or cocaine abuse is associated with low birth weight, prematurity, and increased maternal and fetal morbidity, likely related to placental vasoconstriction. Available data for amphetamines and methylphenidate suggest no increase in the risk of malformation when used at therapeutic doses, while infants might have slightly lower birth weights. A recent prospective, longitudinal observational study of pregnant women noted an increased risk of gestational hypertension in women maintained on psychostimulants during pregnancy. Vigilant prenatal monitoring of blood pressure is therefore indicated. There is no neurocognitive data available for infants/children exposed to stimulants in utero. After reviewing this data with patient, we mutually agreed to go forward with treatment during pregnancy. Edwin reviewed, UDS ordered, and controlled substance agreement signed.  CONTINUE pristiq 50 mg qday. Risks, benefits, alternatives discussed with patient including GI upset, nausea vomiting diarrhea, theoretical decrease of seizure threshold predisposing the patient to a slightly higher seizure risk, headaches, sexual dysfunction, serotonin syndrome, bleeding risk, increased suicidality in patients 24 years and younger, switching to adarsh/hypomania.  Also constipation and urinary retention.  SSRIs are the best studied class of antidepressants regarding use in pregnancy. An estimated 10% of all pregnant females are taking an SSRI. Risks, benefits, and potential side effects of SSRIs in pregnancy, including general pregnancy outcomes (birth weight, length of gestation and APGAR scores), major and minor congenital malformations, particularly septal cardiac defects, persistent pulmonary hypertension of the  (PPHN), neurocognitive development and autism, post partum hemorrhage, and  adaptation syndrome were discussed in detail with the patient. These risks are not  dose-dependent. Most studies linking SSRIs to adverse birth outcomes are confounded by indication for their use (meaning, behaviors and risk factors associated with the psychiatric illness might influence some of these associations). Large studies that attempt to control for the underlying psychiatric illness generally suggest no increased risk of adverse birth/infant/child outcomes. Notably, there is a high relapse rate in women who stop their antidepressants for pregnancy. After discussion of these risks, as well as risk of untreated psychiatric illness, patient and examiner have mutually agreed to go forward with use of this medication during pregnancy. SNRIs risks are the same as SSRIs, but require further vigilance regarding BPs. Also more recent data shows higher risk of poorer pregnancy outcomes with Cymbalta (postpartum hemorrhage), but the confidence interval is not concerning with the exception of postpartum hemorrhage, which is increased with any serotonergic agent.  NOT REALLY TAKING hydroxyzine 10 mg tid prn anxiety. Risks, benefits, alternatives discussed with patient including sedation, dizziness, fall risk, GI upset, and risk of increased CNS depression and elevated heart rate if taken with other antihistamines.  Use care when operating vehicle, vessel, or machine. Safe in pregnancy but watch postpartum as it can reduce milk letdown. After discussion of these risks and benefits, the patient voiced understanding and agreed to proceed.  S/P:  buspar (no data regarding safety in pregnancy)  adderall, propranolol (never started either).  Labs: UDS now    Patient screened positive for depression based on a PHQ-9 score of 9 on 2/12/2025. Follow-up recommendations include: Prescribed antidepressant medication treatment and Suicide Risk Assessment performed.           TREATMENT PLAN/GOALS: Continue supportive psychotherapy efforts and medications as indicated. Treatment and medication options discussed  during today's visit. Patient acknowledged and verbally consented to continue with current treatment plan and was educated on the importance of compliance with treatment and follow-up appointments.    MEDICATION ISSUES:  MINH reviewed as expected.  Discussed medication options and treatment plan of prescribed medication as well as the risks, benefits, and side effects including potential falls, possible impaired driving and metabolic adversities among others. Patient is agreeable to call the office with any worsening of symptoms or onset of side effects. Patient is agreeable to call 911 or go to the nearest ER should he/she begin having SI/HI. No medication side effects or related complaints today.     MEDS ORDERED DURING VISIT:  No orders of the defined types were placed in this encounter.      Return in about 6 weeks (around 8/8/2025) for Video visit.         This document has been electronically signed by Karson Garza MD  June 27, 2025 17:27 EDT    Dictated Utilizing Dragon Dictation: Part of this note may be an electronic transcription/translation of spoken language to printed text using the Dragon Dictation System.

## 2025-06-30 ENCOUNTER — TELEPHONE (OUTPATIENT)
Dept: PSYCHIATRY | Facility: CLINIC | Age: 30
End: 2025-06-30
Payer: COMMERCIAL

## 2025-06-30 DIAGNOSIS — O23.40 URINARY TRACT INFECTION IN MOTHER DURING PREGNANCY, ANTEPARTUM: Primary | ICD-10-CM

## 2025-06-30 PROBLEM — O21.9 NAUSEA AND VOMITING DURING PREGNANCY: Status: RESOLVED | Noted: 2025-02-25 | Resolved: 2025-06-30

## 2025-07-01 ENCOUNTER — PATIENT OUTREACH (OUTPATIENT)
Dept: LABOR AND DELIVERY | Facility: HOSPITAL | Age: 30
End: 2025-07-01
Payer: COMMERCIAL

## 2025-07-01 NOTE — TELEPHONE ENCOUNTER
I attempted to call the pt and had to leave a voicemail.  Pt's urine culture was still positive.  Due to this, Dr. Morris advises suppressive therapy the remainder of her pregnancy and has sent a prescription to her pharmacy.  She will take Keflex 250 mg by mouth daily.

## 2025-07-01 NOTE — TELEPHONE ENCOUNTER
Patient called and informed of urine culture results. She understands to begin taking an antibiotic daily.

## 2025-07-01 NOTE — OUTREACH NOTE
[facility]    [facility].    Lesly Cardenas RN  Maternity Nurse Navigator    7/1/2025, 08:42 EDT

## 2025-07-14 NOTE — PROGRESS NOTES
Routine Prenatal Visit     Subjective  Nicole Meade is a 30 y.o.  at 28w1d here for her routine OB visit.   She is taking her prenatal vitamins.Reports no loss of fluid or vaginal bleeding. Patient doing well.     Pregnancy is complicated by:     Patient Active Problem List   Diagnosis    Supervision of normal first pregnancy, antepartum    Anxiety disorder affecting pregnancy, antepartum    Rh negative, antepartum    Rubella non-immune status, antepartum    Maternal anemia in pregnancy, antepartum    UTI (urinary tract infection) during pregnancy    Diet controlled gestational diabetes mellitus (GDM), antepartum         OB History    Para Term  AB Living   1        SAB IAB Ectopic Molar Multiple Live Births              # Outcome Date GA Lbr Lux/2nd Weight Sex Type Anes PTL Lv   1 Current                    ROS:    General ROS: negative for - chills or fatigue  Genito-Urinary ROS: negative for  change in urinary stream, vaginal discharge     Objective  Physical Exam:    Vitals:    07/15/25 1237   BP: 134/85       Uterine Size: size equals dates  FHT: 110-160 BPM         Assessment/Plan:   Diagnoses and all orders for this visit:    1. Rh negative, antepartum (Primary)  -     Rhogam Immune Globulin Immunization  -      RhIg Evaluation  -     Doses of rh immune globulin    2. Supervision of normal first pregnancy, antepartum  -     POC Urinalysis Dipstick  -     Rhogam Immune Globulin Immunization  -      RhIg Evaluation  -     Doses of rh immune globulin  -      Ob 14 + Weeks Single or First Gestation; Future    3. 28 weeks gestation of pregnancy  -     POC Urinalysis Dipstick  -     Rhogam Immune Globulin Immunization  -      RhIg Evaluation  -     Doses of rh immune globulin    4. Urinary tract infection in mother during pregnancy, antepartum    5. Diet controlled gestational diabetes mellitus (GDM), antepartum  Assessment & Plan:  Patient did bring glucose  logs today however she is only been checking for a few days.  She had 2 elevated postprandials.  We discussed dietary changes.  She is going to see diabetic education.  We will check her logs in 2 weeks      6. Gastroesophageal reflux disease without esophagitis  -     famotidine (Pepcid) 20 MG tablet; Take 1 tablet by mouth 2 (Two) Times a Day As Needed for Heartburn.  Dispense: 60 tablet; Refill: 1          Counseling:   OB precautions, leaking, VB, maria e doss vs PTL/Labor  [facility]  GESTATIONAL DIABETES discussed.  Importance of strict BS control (goals <95F & <120 2hr PP).  Record and bring all BS to qOV (provided log).  Risks of inadequate control NLT macrosomia, shoulder dystocia +/- perm nn damage/fractures/O2 deprivation/brain damage, maternal lacs/dyspareunia, incontinence, prolapse, CS, hemorrhage/transfusion, childhood obesity/DM.  Appropriate diet choices, consistency with eating/sleep schedules, & exercising recommended.  Lifetime risk DM and need for PP and yearly glucose checks.  Message me or our office  on MyChart or call if >25% of BS out of normal range before next OV to discuss treatment.  Round Ligament Pain:  The uterus has several ligaments which provide support and keep the uterus in place. As the  uterus grows these ligaments are pulled and stretched which often causes sharp stabbing like pain in the inguinal area.   You may find a pregnancy support band helpful. Changing positions may also help. Yoga is a great way to cope with round ligament and low back pain in pregnancy.    Massage may also help with low back pain   Things to Consider at this Point in your Pregnancy:  Some women experience swelling in their feet during pregnancy. Compression stockings may help  Drink plenty of water and stay active   Make sure you are eating frequent small meals, nuts are a wonderful snack to keep with you            Return in about 2 weeks (around 7/29/2025) for Routine OB visit.      We have gone over  prenatal care to include the timing and content of visits. I informed her how to contact the office and/or on call person in the event of any problems and encouraged her to do so when she feels it is necessary.  We then spent time answering her questions which she indicated were answered to her satisfaction.    Ghislaine Morris DO  7/15/2025 20:27 EDT

## 2025-07-15 ENCOUNTER — ROUTINE PRENATAL (OUTPATIENT)
Dept: OBSTETRICS AND GYNECOLOGY | Age: 30
End: 2025-07-15
Payer: COMMERCIAL

## 2025-07-15 VITALS — DIASTOLIC BLOOD PRESSURE: 85 MMHG | SYSTOLIC BLOOD PRESSURE: 134 MMHG | BODY MASS INDEX: 25.86 KG/M2 | WEIGHT: 146 LBS

## 2025-07-15 DIAGNOSIS — Z34.00 SUPERVISION OF NORMAL FIRST PREGNANCY, ANTEPARTUM: ICD-10-CM

## 2025-07-15 DIAGNOSIS — K21.9 GASTROESOPHAGEAL REFLUX DISEASE WITHOUT ESOPHAGITIS: ICD-10-CM

## 2025-07-15 DIAGNOSIS — Z67.91 RH NEGATIVE, ANTEPARTUM: Primary | ICD-10-CM

## 2025-07-15 DIAGNOSIS — O23.40 URINARY TRACT INFECTION IN MOTHER DURING PREGNANCY, ANTEPARTUM: ICD-10-CM

## 2025-07-15 DIAGNOSIS — Z3A.28 28 WEEKS GESTATION OF PREGNANCY: ICD-10-CM

## 2025-07-15 DIAGNOSIS — O24.410 DIET CONTROLLED GESTATIONAL DIABETES MELLITUS (GDM), ANTEPARTUM: ICD-10-CM

## 2025-07-15 DIAGNOSIS — O26.899 RH NEGATIVE, ANTEPARTUM: Primary | ICD-10-CM

## 2025-07-15 LAB
ABO GROUP BLD: NORMAL
BLD GP AB SCN SERPL QL: NEGATIVE
GLUCOSE UR STRIP-MCNC: NEGATIVE MG/DL
NUMBER OF DOSES: ABNORMAL
PROT UR STRIP-MCNC: NEGATIVE MG/DL
RH BLD: NEGATIVE

## 2025-07-15 PROCEDURE — 86900 BLOOD TYPING SEROLOGIC ABO: CPT | Performed by: STUDENT IN AN ORGANIZED HEALTH CARE EDUCATION/TRAINING PROGRAM

## 2025-07-15 PROCEDURE — 86901 BLOOD TYPING SEROLOGIC RH(D): CPT | Performed by: STUDENT IN AN ORGANIZED HEALTH CARE EDUCATION/TRAINING PROGRAM

## 2025-07-15 PROCEDURE — 86850 RBC ANTIBODY SCREEN: CPT | Performed by: STUDENT IN AN ORGANIZED HEALTH CARE EDUCATION/TRAINING PROGRAM

## 2025-07-15 RX ORDER — FAMOTIDINE 20 MG/1
20 TABLET, FILM COATED ORAL 2 TIMES DAILY PRN
Qty: 60 TABLET | Refills: 1 | Status: SHIPPED | OUTPATIENT
Start: 2025-07-15

## 2025-07-15 RX ORDER — BLOOD-GLUCOSE METER
KIT MISCELLANEOUS 4 TIMES DAILY
COMMUNITY
Start: 2025-07-11

## 2025-07-15 NOTE — ASSESSMENT & PLAN NOTE
Patient did bring glucose logs today however she is only been checking for a few days.  She had 2 elevated postprandials.  We discussed dietary changes.  She is going to see diabetic education.  We will check her logs in 2 weeks

## 2025-07-24 ENCOUNTER — EDUCATION (OUTPATIENT)
Dept: DIABETES SERVICES | Facility: HOSPITAL | Age: 30
End: 2025-07-24
Payer: COMMERCIAL

## 2025-07-24 DIAGNOSIS — O24.410 DIET CONTROLLED GESTATIONAL DIABETES MELLITUS (GDM) IN SECOND TRIMESTER: Primary | ICD-10-CM

## 2025-07-24 PROCEDURE — G0108 DIAB MANAGE TRN  PER INDIV: HCPCS

## 2025-07-24 NOTE — PROGRESS NOTES
Nicole Meade 30 y.o. was referred from Ghislaine Morris DO. Patient is a  .    Ht: 63 inches    Wt: 146 pounds    No Known Allergies     Date: 2025 1 Hour Glucola: 177.  Date: 2025 3 Hour Glucola:Fasting 87, 1hr: 203, 2hr 161, 3hr 68.    Nicole Meade was instructed regarding causes and effects of gestational diabetes. She was instructed regarding the possible effects gestational diabetes may have for her and her infant.     Patient was also instructed in a balanced 2000 calorie diet. (3 medium meals and 3 snacks a day) 42-47 carbs per meal. Nutrition in the Fast Azam given to patient. Explained to patient when eating out to make healthy choices and taking part of the meal home in a to go box. Explained protein was needed with every meal.    I also advised the patient regarding blood glucose monitoring. She needs to test her blood sugar fasting and 2 hours after each meal. Blood glucose log was given to patient. This information should be given to her provider at each visit.     The importance of exercise, medications, prenatal care, postpartum care were discussed and she was encouraged to breast feed. Patient was encouraged to have follow up blood work after pregnancy. Written materials given to patient. DSME program telephone number given to patient. Patient encouraged to call with any questions, comments, or concerns.     Time started: 1:25 PM    Time ended: 2:00 PM    Cristine Ledesma RNC-AWBRITTANY, MLDE, [facility]  2025

## 2025-07-30 ENCOUNTER — ROUTINE PRENATAL (OUTPATIENT)
Dept: OBSTETRICS AND GYNECOLOGY | Age: 30
End: 2025-07-30
Payer: COMMERCIAL

## 2025-07-30 VITALS — WEIGHT: 159 LBS | DIASTOLIC BLOOD PRESSURE: 84 MMHG | BODY MASS INDEX: 28.17 KG/M2 | SYSTOLIC BLOOD PRESSURE: 130 MMHG

## 2025-07-30 DIAGNOSIS — Z34.00 SUPERVISION OF NORMAL FIRST PREGNANCY, ANTEPARTUM: Primary | ICD-10-CM

## 2025-07-30 DIAGNOSIS — O24.410 DIET CONTROLLED GESTATIONAL DIABETES MELLITUS (GDM), ANTEPARTUM: ICD-10-CM

## 2025-07-30 DIAGNOSIS — O23.40 URINARY TRACT INFECTION IN MOTHER DURING PREGNANCY, ANTEPARTUM: ICD-10-CM

## 2025-07-30 LAB
GLUCOSE UR STRIP-MCNC: NEGATIVE MG/DL
PROT UR STRIP-MCNC: NEGATIVE MG/DL

## 2025-07-30 PROCEDURE — 87086 URINE CULTURE/COLONY COUNT: CPT | Performed by: OBSTETRICS & GYNECOLOGY

## 2025-07-30 NOTE — PROGRESS NOTES
Routine Prenatal Visit     Subjective  Nicole Meade is a 30 y.o.  at 30w3d here for her routine OB visit.   She is taking her prenatal vitamins.Reports no loss of fluid or vaginal bleeding. Patient doing well without any complaints. Pregnancy complicated by:     Patient Active Problem List   Diagnosis    Supervision of normal first pregnancy, antepartum    Anxiety disorder affecting pregnancy, antepartum    Rh negative, antepartum    Rubella non-immune status, antepartum    Maternal anemia in pregnancy, antepartum    UTI (urinary tract infection) during pregnancy    Diet controlled gestational diabetes mellitus (GDM), antepartum         OB History    Para Term  AB Living   1        SAB IAB Ectopic Molar Multiple Live Births              # Outcome Date GA Lbr Lux/2nd Weight Sex Type Anes PTL Lv   1 Current                    ROS:   Review of Systems - General ROS: negative  Psychological ROS: negative  Endocrine ROS: negative  Breast ROS: negative  Respiratory ROS: negative  Cardiovascular ROS: negative  Gastrointestinal ROS: negative  Genito-Urinary ROS: negative  Musculoskeletal ROS: negative  Neurological ROS: negative  Dermatological ROS: negative    Objective  Physical Exam:   Vitals:    25 1004   BP: 130/84       Uterine Size: not examined, US today  FHT: 110-160 BPM    General appearance - alert, well appearing, and in no distress  Mental status - alert, oriented to person, place, and time  Abdomen- Soft, Gravid uterus, non-tender to palpation  Musculoskeletal: negative for - gait disturbance or joint pain  Extremeties: negative swelling or cyanosis   Dermatological: negative rashes or skin lesions       Assessment/Plan:   Diagnoses and all orders for this visit:    1. Supervision of normal first pregnancy, antepartum (Primary)  -     POC Urinalysis Dipstick    2. Urinary tract infection in mother during pregnancy, antepartum  -     Urine Culture - Urine, Urine, Clean  Catch    3. Diet controlled gestational diabetes mellitus (GDM), antepartum  Assessment & Plan:  Growth US every 3-4 weeks    testing 32 weeks   BS reviewed and WNL    Orders:  -     Fetal Nonstress Test; Standing            Counseling:   OB precautions, leaking, VB, maria e doss vs PTL/Labor  [facility]  HTN precautions reviewed: HA, vision change, RUQ/epigastric pain, edema  Round Ligament Pain:  The uterus has several ligaments which provide support and keep the uterus in place. As the  uterus grows these ligaments are pulled and stretched which often causes sharp stabbing like pain in the inguinal area.   You may find a pregnancy support band helpful. Changing positions may also help. Yoga is a great way to cope with round ligament and low back pain in pregnancy.    Massage may also help with low back pain   Things to Consider at this Point in your Pregnancy:  Some women experience swelling in their feet during pregnancy. Compression stockings may help  Drink plenty of water and stay active   Make sure you are eating frequent small meals, nuts are a wonderful snack to keep with you            Return in about 2 weeks (around 2025) for Routine OB visit.      We have gone over prenatal care to include the timing and content of visits. I informed her how to contact the office and/or on call person in the event of any problems and encouraged her to do so when she feels it is necessary.  We then spent time answering her questions which she indicated were answered to her satisfaction.    Karrie Moffett DO  2025 10:08 EDT

## 2025-08-01 LAB — BACTERIA SPEC AEROBE CULT: NO GROWTH

## 2025-08-08 ENCOUNTER — TELEMEDICINE (OUTPATIENT)
Dept: PSYCHIATRY | Facility: CLINIC | Age: 30
End: 2025-08-08
Payer: COMMERCIAL

## 2025-08-08 DIAGNOSIS — F51.05 INSOMNIA DUE TO MENTAL CONDITION: ICD-10-CM

## 2025-08-08 DIAGNOSIS — F41.1 GENERALIZED ANXIETY DISORDER: ICD-10-CM

## 2025-08-08 DIAGNOSIS — F41.0 PANIC ATTACKS: ICD-10-CM

## 2025-08-08 DIAGNOSIS — F33.1 MAJOR DEPRESSIVE DISORDER, RECURRENT EPISODE, MODERATE: ICD-10-CM

## 2025-08-08 DIAGNOSIS — F90.0 ADHD (ATTENTION DEFICIT HYPERACTIVITY DISORDER), INATTENTIVE TYPE: Primary | ICD-10-CM

## 2025-08-08 RX ORDER — DESVENLAFAXINE 25 MG/1
25 TABLET, EXTENDED RELEASE ORAL DAILY
Qty: 30 TABLET | Refills: 2 | Status: SHIPPED | OUTPATIENT
Start: 2025-08-08

## 2025-08-08 RX ORDER — AMPHETAMINE 10 MG/1
1 TABLET, EXTENDED RELEASE ORAL
Qty: 30 TABLET | Refills: 0 | Status: SHIPPED | OUTPATIENT
Start: 2025-08-08

## 2025-08-11 ENCOUNTER — TELEPHONE (OUTPATIENT)
Dept: PSYCHIATRY | Facility: CLINIC | Age: 30
End: 2025-08-11
Payer: COMMERCIAL

## 2025-08-11 ENCOUNTER — TELEPHONE (OUTPATIENT)
Dept: OBSTETRICS AND GYNECOLOGY | Age: 30
End: 2025-08-11
Payer: COMMERCIAL

## 2025-08-12 ENCOUNTER — HOSPITAL ENCOUNTER (OUTPATIENT)
Facility: HOSPITAL | Age: 30
Discharge: HOME | End: 2025-08-12
Attending: OBSTETRICS & GYNECOLOGY | Admitting: OBSTETRICS & GYNECOLOGY
Payer: COMMERCIAL

## 2025-08-12 VITALS — HEART RATE: 97 BPM | DIASTOLIC BLOOD PRESSURE: 78 MMHG | SYSTOLIC BLOOD PRESSURE: 138 MMHG

## 2025-08-12 PROCEDURE — G0463 HOSPITAL OUTPT CLINIC VISIT: HCPCS

## 2025-08-12 PROCEDURE — 59025 FETAL NON-STRESS TEST: CPT | Performed by: OBSTETRICS & GYNECOLOGY

## 2025-08-12 PROCEDURE — 59025 FETAL NON-STRESS TEST: CPT

## 2025-08-14 ENCOUNTER — TELEPHONE (OUTPATIENT)
Dept: OBSTETRICS AND GYNECOLOGY | Age: 30
End: 2025-08-14
Payer: COMMERCIAL

## 2025-08-18 ENCOUNTER — ROUTINE PRENATAL (OUTPATIENT)
Dept: OBSTETRICS AND GYNECOLOGY | Age: 30
End: 2025-08-18
Payer: COMMERCIAL

## 2025-08-18 DIAGNOSIS — Z34.00 SUPERVISION OF NORMAL FIRST PREGNANCY, ANTEPARTUM: Primary | ICD-10-CM

## 2025-08-18 DIAGNOSIS — Z67.91 RH NEGATIVE, ANTEPARTUM: ICD-10-CM

## 2025-08-18 DIAGNOSIS — Z3A.33 33 WEEKS GESTATION OF PREGNANCY: ICD-10-CM

## 2025-08-18 DIAGNOSIS — O24.410 DIET CONTROLLED GESTATIONAL DIABETES MELLITUS (GDM), ANTEPARTUM: ICD-10-CM

## 2025-08-18 DIAGNOSIS — O26.899 RH NEGATIVE, ANTEPARTUM: ICD-10-CM

## 2025-08-18 DIAGNOSIS — O13.3 GESTATIONAL HYPERTENSION, THIRD TRIMESTER: ICD-10-CM

## 2025-08-18 LAB
ALBUMIN SERPL-MCNC: 3.4 G/DL (ref 3.5–5.2)
ALBUMIN/GLOB SERPL: 1.2 G/DL
ALP SERPL-CCNC: 141 U/L (ref 39–117)
ALT SERPL W P-5'-P-CCNC: 15 U/L (ref 1–33)
ANION GAP SERPL CALCULATED.3IONS-SCNC: 14.3 MMOL/L (ref 5–15)
AST SERPL-CCNC: 21 U/L (ref 1–32)
BASOPHILS # BLD AUTO: 0.03 10*3/MM3 (ref 0–0.2)
BASOPHILS NFR BLD AUTO: 0.4 % (ref 0–1.5)
BILIRUB SERPL-MCNC: <0.2 MG/DL (ref 0–1.2)
BUN SERPL-MCNC: 13 MG/DL (ref 6–20)
BUN/CREAT SERPL: 16 (ref 7–25)
CALCIUM SPEC-SCNC: 9.3 MG/DL (ref 8.6–10.5)
CHLORIDE SERPL-SCNC: 105 MMOL/L (ref 98–107)
CO2 SERPL-SCNC: 16.7 MMOL/L (ref 22–29)
CREAT SERPL-MCNC: 0.81 MG/DL (ref 0.57–1)
CREAT UR-MCNC: 122.2 MG/DL
DEPRECATED RDW RBC AUTO: 41 FL (ref 37–54)
EGFRCR SERPLBLD CKD-EPI 2021: 100.3 ML/MIN/1.73
EOSINOPHIL # BLD AUTO: 0.03 10*3/MM3 (ref 0–0.4)
EOSINOPHIL NFR BLD AUTO: 0.4 % (ref 0.3–6.2)
ERYTHROCYTE [DISTWIDTH] IN BLOOD BY AUTOMATED COUNT: 12.6 % (ref 12.3–15.4)
GLOBULIN UR ELPH-MCNC: 2.9 GM/DL
GLUCOSE SERPL-MCNC: 65 MG/DL (ref 65–99)
GLUCOSE UR STRIP-MCNC: NEGATIVE MG/DL
HCT VFR BLD AUTO: 35.6 % (ref 34–46.6)
HGB BLD-MCNC: 11.5 G/DL (ref 12–15.9)
IMM GRANULOCYTES # BLD AUTO: 0.03 10*3/MM3 (ref 0–0.05)
IMM GRANULOCYTES NFR BLD AUTO: 0.4 % (ref 0–0.5)
LDH SERPL-CCNC: 286 U/L (ref 135–214)
LYMPHOCYTES # BLD AUTO: 1.35 10*3/MM3 (ref 0.7–3.1)
LYMPHOCYTES NFR BLD AUTO: 16.6 % (ref 19.6–45.3)
MCH RBC QN AUTO: 29.5 PG (ref 26.6–33)
MCHC RBC AUTO-ENTMCNC: 32.3 G/DL (ref 31.5–35.7)
MCV RBC AUTO: 91.3 FL (ref 79–97)
MONOCYTES # BLD AUTO: 0.72 10*3/MM3 (ref 0.1–0.9)
MONOCYTES NFR BLD AUTO: 8.9 % (ref 5–12)
NEUTROPHILS NFR BLD AUTO: 5.96 10*3/MM3 (ref 1.7–7)
NEUTROPHILS NFR BLD AUTO: 73.3 % (ref 42.7–76)
NRBC BLD AUTO-RTO: 0 /100 WBC (ref 0–0.2)
PLATELET # BLD AUTO: 307 10*3/MM3 (ref 140–450)
PMV BLD AUTO: 11.5 FL (ref 6–12)
POTASSIUM SERPL-SCNC: 4.6 MMOL/L (ref 3.5–5.2)
PROT ?TM UR-MCNC: 34.2 MG/DL
PROT SERPL-MCNC: 6.3 G/DL (ref 6–8.5)
PROT UR STRIP-MCNC: ABNORMAL MG/DL
PROT/CREAT UR: 0.28 MG/G{CREAT}
RBC # BLD AUTO: 3.9 10*6/MM3 (ref 3.77–5.28)
SODIUM SERPL-SCNC: 136 MMOL/L (ref 136–145)
WBC NRBC COR # BLD AUTO: 8.12 10*3/MM3 (ref 3.4–10.8)

## 2025-08-18 PROCEDURE — 84156 ASSAY OF PROTEIN URINE: CPT

## 2025-08-18 PROCEDURE — 82570 ASSAY OF URINE CREATININE: CPT

## 2025-08-18 PROCEDURE — 80053 COMPREHEN METABOLIC PANEL: CPT

## 2025-08-18 PROCEDURE — 99214 OFFICE O/P EST MOD 30 MIN: CPT

## 2025-08-18 PROCEDURE — 83615 LACTATE (LD) (LDH) ENZYME: CPT

## 2025-08-18 PROCEDURE — 85025 COMPLETE CBC W/AUTO DIFF WBC: CPT

## 2025-08-19 ENCOUNTER — HOSPITAL ENCOUNTER (OUTPATIENT)
Dept: LABOR AND DELIVERY | Facility: HOSPITAL | Age: 30
Discharge: HOME | End: 2025-08-19
Payer: COMMERCIAL

## 2025-08-19 ENCOUNTER — HOSPITAL ENCOUNTER (OUTPATIENT)
Facility: HOSPITAL | Age: 30
Discharge: HOME | End: 2025-08-19
Attending: OBSTETRICS & GYNECOLOGY | Admitting: OBSTETRICS & GYNECOLOGY
Payer: COMMERCIAL

## 2025-08-19 ENCOUNTER — RESULTS FOLLOW-UP (OUTPATIENT)
Dept: OBSTETRICS AND GYNECOLOGY | Age: 30
End: 2025-08-19
Payer: COMMERCIAL

## 2025-08-19 PROBLEM — O13.3 GESTATIONAL HYPERTENSION, THIRD TRIMESTER: Status: ACTIVE | Noted: 2025-08-19

## 2025-08-22 ENCOUNTER — TELEMEDICINE (OUTPATIENT)
Dept: PSYCHIATRY | Facility: CLINIC | Age: 30
End: 2025-08-22
Payer: COMMERCIAL

## 2025-08-22 ENCOUNTER — HOSPITAL ENCOUNTER (OUTPATIENT)
Dept: LABOR AND DELIVERY | Facility: HOSPITAL | Age: 30
Discharge: HOME | End: 2025-08-22
Payer: COMMERCIAL

## 2025-08-22 ENCOUNTER — HOSPITAL ENCOUNTER (OUTPATIENT)
Facility: HOSPITAL | Age: 30
Discharge: HOME | End: 2025-08-22
Attending: OBSTETRICS & GYNECOLOGY | Admitting: OBSTETRICS & GYNECOLOGY
Payer: COMMERCIAL

## 2025-08-22 DIAGNOSIS — F41.0 PANIC ATTACKS: ICD-10-CM

## 2025-08-22 DIAGNOSIS — F41.1 GENERALIZED ANXIETY DISORDER: ICD-10-CM

## 2025-08-22 DIAGNOSIS — F51.05 INSOMNIA DUE TO MENTAL CONDITION: ICD-10-CM

## 2025-08-22 DIAGNOSIS — F33.1 MAJOR DEPRESSIVE DISORDER, RECURRENT EPISODE, MODERATE: ICD-10-CM

## 2025-08-22 DIAGNOSIS — F90.0 ADHD (ATTENTION DEFICIT HYPERACTIVITY DISORDER), INATTENTIVE TYPE: Primary | ICD-10-CM

## 2025-08-24 ENCOUNTER — HOSPITAL ENCOUNTER (OUTPATIENT)
Dept: LABOR AND DELIVERY | Facility: HOSPITAL | Age: 30
Discharge: HOME | End: 2025-08-24
Payer: COMMERCIAL

## 2025-08-24 ENCOUNTER — HOSPITAL ENCOUNTER (INPATIENT)
Facility: HOSPITAL | Age: 30
LOS: 2 days | Discharge: HOME | End: 2025-08-26
Attending: OBSTETRICS & GYNECOLOGY | Admitting: OBSTETRICS & GYNECOLOGY
Payer: COMMERCIAL

## 2025-08-24 ENCOUNTER — APPOINTMENT (OUTPATIENT)
Dept: ULTRASOUND IMAGING | Facility: HOSPITAL | Age: 30
End: 2025-08-24
Payer: COMMERCIAL

## 2025-08-24 PROBLEM — O14.93 PRE-ECLAMPSIA IN THIRD TRIMESTER: Status: ACTIVE | Noted: 2025-08-19

## 2025-08-24 PROBLEM — O14.90 PRE-ECLAMPSIA: Status: ACTIVE | Noted: 2025-08-24

## 2025-08-24 LAB
ABO GROUP BLD: NORMAL
ALBUMIN SERPL-MCNC: 3.5 G/DL (ref 3.5–5.2)
ALBUMIN/GLOB SERPL: 1.2 G/DL
ALP SERPL-CCNC: 158 U/L (ref 39–117)
ALT SERPL W P-5'-P-CCNC: 16 U/L (ref 1–33)
AMPHET+METHAMPHET UR QL: POSITIVE
AMPHETAMINES UR QL: NEGATIVE
ANION GAP SERPL CALCULATED.3IONS-SCNC: 10.5 MMOL/L (ref 5–15)
AST SERPL-CCNC: 19 U/L (ref 1–32)
BARBITURATES UR QL SCN: POSITIVE
BENZODIAZ UR QL SCN: NEGATIVE
BILIRUB SERPL-MCNC: <0.2 MG/DL (ref 0–1.2)
BLD GP AB SCN SERPL QL: POSITIVE
BUN SERPL-MCNC: 12.1 MG/DL (ref 6–20)
BUN/CREAT SERPL: 15.9 (ref 7–25)
BUPRENORPHINE SERPL-MCNC: NEGATIVE NG/ML
CALCIUM SPEC-SCNC: 9.1 MG/DL (ref 8.6–10.5)
CANNABINOIDS SERPL QL: NEGATIVE
CHLORIDE SERPL-SCNC: 102 MMOL/L (ref 98–107)
CO2 SERPL-SCNC: 20.5 MMOL/L (ref 22–29)
COCAINE UR QL: NEGATIVE
COLLECT DURATION TIME UR: 24 HRS
CREAT SERPL-MCNC: 0.76 MG/DL (ref 0.57–1)
DEPRECATED RDW RBC AUTO: 40.6 FL (ref 37–54)
EGFRCR SERPLBLD CKD-EPI 2021: 108.3 ML/MIN/1.73
ERYTHROCYTE [DISTWIDTH] IN BLOOD BY AUTOMATED COUNT: 12.6 % (ref 12.3–15.4)
FENTANYL UR-MCNC: NEGATIVE NG/ML
GLOBULIN UR ELPH-MCNC: 3 GM/DL
GLUCOSE BLDC GLUCOMTR-MCNC: 155 MG/DL (ref 70–99)
GLUCOSE SERPL-MCNC: 85 MG/DL (ref 65–99)
HCT VFR BLD AUTO: 34.7 % (ref 34–46.6)
HGB BLD-MCNC: 11.5 G/DL (ref 12–15.9)
HOLD SPECIMEN: NORMAL
MCH RBC QN AUTO: 29.4 PG (ref 26.6–33)
MCHC RBC AUTO-ENTMCNC: 33.1 G/DL (ref 31.5–35.7)
MCV RBC AUTO: 88.7 FL (ref 79–97)
METHADONE UR QL SCN: NEGATIVE
OPIATES UR QL: NEGATIVE
OXYCODONE UR QL SCN: NEGATIVE
PCP UR QL SCN: NEGATIVE
PLATELET # BLD AUTO: 289 10*3/MM3 (ref 140–450)
PMV BLD AUTO: 12 FL (ref 6–12)
POTASSIUM SERPL-SCNC: 4.2 MMOL/L (ref 3.5–5.2)
PROT 24H UR-MRATE: 465.3 MG/24HOURS (ref 0–150)
PROT SERPL-MCNC: 6.5 G/DL (ref 6–8.5)
RBC # BLD AUTO: 3.91 10*6/MM3 (ref 3.77–5.28)
RESIDUAL RHIG DETECTED: NORMAL
RH BLD: NEGATIVE
SODIUM SERPL-SCNC: 133 MMOL/L (ref 136–145)
SPECIMEN VOL 24H UR: 900 ML
T&S EXPIRATION DATE: NORMAL
TREPONEMA PALLIDUM IGG+IGM AB [PRESENCE] IN SERUM OR PLASMA BY IMMUNOASSAY: NORMAL
TRICYCLICS UR QL SCN: NEGATIVE
WBC NRBC COR # BLD AUTO: 9.18 10*3/MM3 (ref 3.4–10.8)

## 2025-08-24 PROCEDURE — 82948 REAGENT STRIP/BLOOD GLUCOSE: CPT

## 2025-08-24 PROCEDURE — 86900 BLOOD TYPING SEROLOGIC ABO: CPT | Performed by: OBSTETRICS & GYNECOLOGY

## 2025-08-24 PROCEDURE — 86850 RBC ANTIBODY SCREEN: CPT | Performed by: OBSTETRICS & GYNECOLOGY

## 2025-08-24 PROCEDURE — 59025 FETAL NON-STRESS TEST: CPT | Performed by: OBSTETRICS & GYNECOLOGY

## 2025-08-24 PROCEDURE — 81050 URINALYSIS VOLUME MEASURE: CPT | Performed by: OBSTETRICS & GYNECOLOGY

## 2025-08-24 PROCEDURE — 86901 BLOOD TYPING SEROLOGIC RH(D): CPT | Performed by: OBSTETRICS & GYNECOLOGY

## 2025-08-24 PROCEDURE — 86870 RBC ANTIBODY IDENTIFICATION: CPT | Performed by: OBSTETRICS & GYNECOLOGY

## 2025-08-24 PROCEDURE — 25010000002 BETAMETHASONE ACET & SOD PHOS PER 4 MG: Performed by: OBSTETRICS & GYNECOLOGY

## 2025-08-24 PROCEDURE — 86780 TREPONEMA PALLIDUM: CPT | Performed by: OBSTETRICS & GYNECOLOGY

## 2025-08-24 PROCEDURE — 80053 COMPREHEN METABOLIC PANEL: CPT | Performed by: OBSTETRICS & GYNECOLOGY

## 2025-08-24 PROCEDURE — 87653 STREP B DNA AMP PROBE: CPT | Performed by: OBSTETRICS & GYNECOLOGY

## 2025-08-24 PROCEDURE — 25810000003 LACTATED RINGERS PER 1000 ML: Performed by: OBSTETRICS & GYNECOLOGY

## 2025-08-24 PROCEDURE — 25010000002 PENICILLIN G POTASSIUM PER 600000 UNITS: Performed by: OBSTETRICS & GYNECOLOGY

## 2025-08-24 PROCEDURE — 80307 DRUG TEST PRSMV CHEM ANLYZR: CPT | Performed by: OBSTETRICS & GYNECOLOGY

## 2025-08-24 PROCEDURE — 84156 ASSAY OF PROTEIN URINE: CPT | Performed by: OBSTETRICS & GYNECOLOGY

## 2025-08-24 PROCEDURE — 76819 FETAL BIOPHYS PROFIL W/O NST: CPT

## 2025-08-24 PROCEDURE — 85027 COMPLETE CBC AUTOMATED: CPT | Performed by: OBSTETRICS & GYNECOLOGY

## 2025-08-24 RX ORDER — FAMOTIDINE 10 MG/ML
20 INJECTION, SOLUTION INTRAVENOUS EVERY 12 HOURS SCHEDULED
Status: DISCONTINUED | OUTPATIENT
Start: 2025-08-24 | End: 2025-08-26 | Stop reason: HOSPADM

## 2025-08-24 RX ORDER — ONDANSETRON 4 MG/1
8 TABLET, ORALLY DISINTEGRATING ORAL EVERY 8 HOURS PRN
Status: DISCONTINUED | OUTPATIENT
Start: 2025-08-24 | End: 2025-08-26 | Stop reason: HOSPADM

## 2025-08-24 RX ORDER — DOCUSATE SODIUM 100 MG/1
100 CAPSULE, LIQUID FILLED ORAL 2 TIMES DAILY PRN
Status: DISCONTINUED | OUTPATIENT
Start: 2025-08-24 | End: 2025-08-26 | Stop reason: HOSPADM

## 2025-08-24 RX ORDER — SODIUM CHLORIDE, SODIUM LACTATE, POTASSIUM CHLORIDE, CALCIUM CHLORIDE 600; 310; 30; 20 MG/100ML; MG/100ML; MG/100ML; MG/100ML
100 INJECTION, SOLUTION INTRAVENOUS CONTINUOUS
Status: ACTIVE | OUTPATIENT
Start: 2025-08-24 | End: 2025-08-24

## 2025-08-24 RX ORDER — LIDOCAINE HYDROCHLORIDE 10 MG/ML
0.5 INJECTION, SOLUTION EPIDURAL; INFILTRATION; INTRACAUDAL; PERINEURAL ONCE AS NEEDED
Status: DISCONTINUED | OUTPATIENT
Start: 2025-08-24 | End: 2025-08-26 | Stop reason: HOSPADM

## 2025-08-24 RX ORDER — SODIUM CHLORIDE 9 MG/ML
40 INJECTION, SOLUTION INTRAVENOUS AS NEEDED
Status: DISCONTINUED | OUTPATIENT
Start: 2025-08-24 | End: 2025-08-26 | Stop reason: HOSPADM

## 2025-08-24 RX ORDER — ONDANSETRON 2 MG/ML
4 INJECTION INTRAMUSCULAR; INTRAVENOUS EVERY 8 HOURS PRN
Status: DISCONTINUED | OUTPATIENT
Start: 2025-08-24 | End: 2025-08-26 | Stop reason: HOSPADM

## 2025-08-24 RX ORDER — BETAMETHASONE SODIUM PHOSPHATE AND BETAMETHASONE ACETATE 3; 3 MG/ML; MG/ML
12 INJECTION, SUSPENSION INTRA-ARTICULAR; INTRALESIONAL; INTRAMUSCULAR; SOFT TISSUE EVERY 24 HOURS
Status: COMPLETED | OUTPATIENT
Start: 2025-08-24 | End: 2025-08-25

## 2025-08-24 RX ORDER — ACETAMINOPHEN 325 MG/1
650 TABLET ORAL EVERY 4 HOURS PRN
Status: DISCONTINUED | OUTPATIENT
Start: 2025-08-24 | End: 2025-08-26 | Stop reason: HOSPADM

## 2025-08-24 RX ORDER — BISACODYL 10 MG
10 SUPPOSITORY, RECTAL RECTAL DAILY PRN
Status: DISCONTINUED | OUTPATIENT
Start: 2025-08-24 | End: 2025-08-26 | Stop reason: HOSPADM

## 2025-08-24 RX ORDER — CALCIUM CARBONATE 500 MG/1
2 TABLET, CHEWABLE ORAL DAILY PRN
Status: DISCONTINUED | OUTPATIENT
Start: 2025-08-24 | End: 2025-08-26 | Stop reason: HOSPADM

## 2025-08-24 RX ORDER — SODIUM CHLORIDE 0.9 % (FLUSH) 0.9 %
10 SYRINGE (ML) INJECTION EVERY 12 HOURS SCHEDULED
Status: DISCONTINUED | OUTPATIENT
Start: 2025-08-24 | End: 2025-08-26 | Stop reason: HOSPADM

## 2025-08-24 RX ORDER — SODIUM CHLORIDE 0.9 % (FLUSH) 0.9 %
10 SYRINGE (ML) INJECTION AS NEEDED
Status: DISCONTINUED | OUTPATIENT
Start: 2025-08-24 | End: 2025-08-26 | Stop reason: HOSPADM

## 2025-08-24 RX ORDER — FAMOTIDINE 20 MG/1
20 TABLET, FILM COATED ORAL EVERY 12 HOURS SCHEDULED
Status: DISCONTINUED | OUTPATIENT
Start: 2025-08-24 | End: 2025-08-26 | Stop reason: HOSPADM

## 2025-08-24 RX ORDER — DESVENLAFAXINE 25 MG/1
25 TABLET, EXTENDED RELEASE ORAL DAILY
Status: DISCONTINUED | OUTPATIENT
Start: 2025-08-25 | End: 2025-08-26 | Stop reason: HOSPADM

## 2025-08-24 RX ADMIN — SODIUM CHLORIDE, POTASSIUM CHLORIDE, SODIUM LACTATE AND CALCIUM CHLORIDE 100 ML/HR: 600; 310; 30; 20 INJECTION, SOLUTION INTRAVENOUS at 14:15

## 2025-08-24 RX ADMIN — SODIUM CHLORIDE 5 MILLION UNITS: 9 INJECTION, SOLUTION INTRAVENOUS at 16:56

## 2025-08-24 RX ADMIN — BETAMETHASONE SODIUM PHOSPHATE AND BETAMETHASONE ACETATE 12 MG: 3; 3 INJECTION, SUSPENSION INTRA-ARTICULAR; INTRALESIONAL; INTRAMUSCULAR at 15:46

## 2025-08-25 ENCOUNTER — TELEPHONE (OUTPATIENT)
Dept: PSYCHIATRY | Facility: CLINIC | Age: 30
End: 2025-08-25
Payer: COMMERCIAL

## 2025-08-25 ENCOUNTER — APPOINTMENT (OUTPATIENT)
Dept: ULTRASOUND IMAGING | Facility: HOSPITAL | Age: 30
End: 2025-08-25
Payer: COMMERCIAL

## 2025-08-25 LAB
ALBUMIN SERPL-MCNC: 2.7 G/DL (ref 3.5–5.2)
ALBUMIN/GLOB SERPL: 1 G/DL
ALP SERPL-CCNC: 137 U/L (ref 39–117)
ALT SERPL W P-5'-P-CCNC: 13 U/L (ref 1–33)
ANION GAP SERPL CALCULATED.3IONS-SCNC: 11.3 MMOL/L (ref 5–15)
AST SERPL-CCNC: 14 U/L (ref 1–32)
BASOPHILS # BLD AUTO: 0.02 10*3/MM3 (ref 0–0.2)
BASOPHILS NFR BLD AUTO: 0.2 % (ref 0–1.5)
BILIRUB SERPL-MCNC: <0.2 MG/DL (ref 0–1.2)
BUN SERPL-MCNC: 15.6 MG/DL (ref 6–20)
BUN/CREAT SERPL: 22 (ref 7–25)
CALCIUM SPEC-SCNC: 8.4 MG/DL (ref 8.6–10.5)
CHLORIDE SERPL-SCNC: 106 MMOL/L (ref 98–107)
CO2 SERPL-SCNC: 16.7 MMOL/L (ref 22–29)
CREAT SERPL-MCNC: 0.71 MG/DL (ref 0.57–1)
DEPRECATED RDW RBC AUTO: 40.3 FL (ref 37–54)
EGFRCR SERPLBLD CKD-EPI 2021: 117.5 ML/MIN/1.73
EOSINOPHIL # BLD AUTO: 0 10*3/MM3 (ref 0–0.4)
EOSINOPHIL NFR BLD AUTO: 0 % (ref 0.3–6.2)
ERYTHROCYTE [DISTWIDTH] IN BLOOD BY AUTOMATED COUNT: 12.4 % (ref 12.3–15.4)
GLOBULIN UR ELPH-MCNC: 2.7 GM/DL
GLUCOSE BLDC GLUCOMTR-MCNC: 108 MG/DL (ref 70–99)
GLUCOSE BLDC GLUCOMTR-MCNC: 111 MG/DL (ref 70–99)
GLUCOSE BLDC GLUCOMTR-MCNC: 164 MG/DL (ref 70–99)
GLUCOSE BLDC GLUCOMTR-MCNC: 97 MG/DL (ref 70–99)
GLUCOSE SERPL-MCNC: 103 MG/DL (ref 65–99)
GP B STREP DNA VAG+RECTUM QL NAA+PROBE: NEGATIVE
HCT VFR BLD AUTO: 31 % (ref 34–46.6)
HGB BLD-MCNC: 10.1 G/DL (ref 12–15.9)
IMM GRANULOCYTES # BLD AUTO: 0.05 10*3/MM3 (ref 0–0.05)
IMM GRANULOCYTES NFR BLD AUTO: 0.4 % (ref 0–0.5)
LYMPHOCYTES # BLD AUTO: 1.29 10*3/MM3 (ref 0.7–3.1)
LYMPHOCYTES NFR BLD AUTO: 10.4 % (ref 19.6–45.3)
MCH RBC QN AUTO: 28.9 PG (ref 26.6–33)
MCHC RBC AUTO-ENTMCNC: 32.6 G/DL (ref 31.5–35.7)
MCV RBC AUTO: 88.8 FL (ref 79–97)
MONOCYTES # BLD AUTO: 0.56 10*3/MM3 (ref 0.1–0.9)
MONOCYTES NFR BLD AUTO: 4.5 % (ref 5–12)
NEUTROPHILS NFR BLD AUTO: 10.48 10*3/MM3 (ref 1.7–7)
NEUTROPHILS NFR BLD AUTO: 84.5 % (ref 42.7–76)
NRBC BLD AUTO-RTO: 0 /100 WBC (ref 0–0.2)
PLATELET # BLD AUTO: 241 10*3/MM3 (ref 140–450)
PMV BLD AUTO: 12.3 FL (ref 6–12)
POTASSIUM SERPL-SCNC: 4.3 MMOL/L (ref 3.5–5.2)
PROT SERPL-MCNC: 5.4 G/DL (ref 6–8.5)
RBC # BLD AUTO: 3.49 10*6/MM3 (ref 3.77–5.28)
SODIUM SERPL-SCNC: 134 MMOL/L (ref 136–145)
WBC NRBC COR # BLD AUTO: 12.4 10*3/MM3 (ref 3.4–10.8)

## 2025-08-25 PROCEDURE — 76819 FETAL BIOPHYS PROFIL W/O NST: CPT

## 2025-08-25 PROCEDURE — 85025 COMPLETE CBC W/AUTO DIFF WBC: CPT | Performed by: OBSTETRICS & GYNECOLOGY

## 2025-08-25 PROCEDURE — 80053 COMPREHEN METABOLIC PANEL: CPT | Performed by: OBSTETRICS & GYNECOLOGY

## 2025-08-25 PROCEDURE — 25010000002 PENICILLIN G POTASSIUM PER 600000 UNITS: Performed by: OBSTETRICS & GYNECOLOGY

## 2025-08-25 PROCEDURE — 25010000002 BETAMETHASONE ACET & SOD PHOS PER 4 MG: Performed by: OBSTETRICS & GYNECOLOGY

## 2025-08-25 PROCEDURE — 82948 REAGENT STRIP/BLOOD GLUCOSE: CPT

## 2025-08-25 PROCEDURE — 82948 REAGENT STRIP/BLOOD GLUCOSE: CPT | Performed by: OBSTETRICS & GYNECOLOGY

## 2025-08-25 RX ADMIN — FAMOTIDINE 20 MG: 20 TABLET, FILM COATED ORAL at 08:29

## 2025-08-25 RX ADMIN — SODIUM CHLORIDE 2.5 MILLION UNITS: 9 INJECTION, SOLUTION INTRAVENOUS at 10:51

## 2025-08-25 RX ADMIN — SODIUM CHLORIDE 2.5 MILLION UNITS: 9 INJECTION, SOLUTION INTRAVENOUS at 15:10

## 2025-08-25 RX ADMIN — SODIUM CHLORIDE 2.5 MILLION UNITS: 9 INJECTION, SOLUTION INTRAVENOUS at 06:43

## 2025-08-25 RX ADMIN — BETAMETHASONE SODIUM PHOSPHATE AND BETAMETHASONE ACETATE 12 MG: 3; 3 INJECTION, SUSPENSION INTRA-ARTICULAR; INTRALESIONAL; INTRAMUSCULAR at 15:02

## 2025-08-25 RX ADMIN — DESVENLAFAXINE SUCCINATE 25 MG: 25 TABLET, EXTENDED RELEASE ORAL at 08:29

## 2025-08-25 RX ADMIN — FAMOTIDINE 20 MG: 20 TABLET, FILM COATED ORAL at 21:46

## 2025-08-26 VITALS
BODY MASS INDEX: 28.7 KG/M2 | SYSTOLIC BLOOD PRESSURE: 126 MMHG | HEIGHT: 63 IN | OXYGEN SATURATION: 100 % | DIASTOLIC BLOOD PRESSURE: 73 MMHG | HEART RATE: 76 BPM | WEIGHT: 162 LBS | TEMPERATURE: 98 F | RESPIRATION RATE: 16 BRPM

## 2025-08-26 LAB
GLUCOSE BLDC GLUCOMTR-MCNC: 102 MG/DL (ref 70–99)
GLUCOSE BLDC GLUCOMTR-MCNC: 121 MG/DL (ref 70–99)

## 2025-08-26 PROCEDURE — 82948 REAGENT STRIP/BLOOD GLUCOSE: CPT | Performed by: OBSTETRICS & GYNECOLOGY

## 2025-08-26 RX ADMIN — FAMOTIDINE 20 MG: 20 TABLET, FILM COATED ORAL at 09:06

## 2025-08-26 RX ADMIN — Medication 10 ML: at 09:07

## 2025-08-26 RX ADMIN — DESVENLAFAXINE SUCCINATE 25 MG: 25 TABLET, EXTENDED RELEASE ORAL at 09:06

## 2025-08-29 ENCOUNTER — APPOINTMENT (OUTPATIENT)
Dept: ULTRASOUND IMAGING | Facility: HOSPITAL | Age: 30
End: 2025-08-29
Payer: COMMERCIAL

## 2025-08-29 ENCOUNTER — HOSPITAL ENCOUNTER (OUTPATIENT)
Facility: HOSPITAL | Age: 30
Discharge: HOME | End: 2025-08-29
Attending: OBSTETRICS & GYNECOLOGY | Admitting: OBSTETRICS & GYNECOLOGY
Payer: COMMERCIAL

## 2025-08-29 VITALS
HEART RATE: 72 BPM | SYSTOLIC BLOOD PRESSURE: 135 MMHG | RESPIRATION RATE: 18 BRPM | OXYGEN SATURATION: 96 % | DIASTOLIC BLOOD PRESSURE: 91 MMHG

## 2025-08-29 PROCEDURE — 63710000001 PROCHLORPERAZINE MALEATE PER 5 MG: Performed by: OBSTETRICS & GYNECOLOGY

## 2025-08-29 PROCEDURE — 63710000001 DIPHENHYDRAMINE PER 50 MG: Performed by: OBSTETRICS & GYNECOLOGY

## 2025-08-29 PROCEDURE — 59025 FETAL NON-STRESS TEST: CPT

## 2025-08-29 PROCEDURE — 76815 OB US LIMITED FETUS(S): CPT

## 2025-08-29 RX ORDER — DIPHENHYDRAMINE HCL 25 MG
25 CAPSULE ORAL EVERY 6 HOURS PRN
Status: DISCONTINUED | OUTPATIENT
Start: 2025-08-29 | End: 2025-08-29 | Stop reason: HOSPADM

## 2025-08-29 RX ORDER — PROCHLORPERAZINE MALEATE 5 MG/1
10 TABLET ORAL ONCE
Status: COMPLETED | OUTPATIENT
Start: 2025-08-29 | End: 2025-08-29

## 2025-08-29 RX ADMIN — PROCHLORPERAZINE MALEATE 10 MG: 5 TABLET ORAL at 17:14

## 2025-08-29 RX ADMIN — DIPHENHYDRAMINE HYDROCHLORIDE 25 MG: 25 CAPSULE ORAL at 16:47
